# Patient Record
Sex: MALE | Race: WHITE | NOT HISPANIC OR LATINO | Employment: FULL TIME | URBAN - METROPOLITAN AREA
[De-identification: names, ages, dates, MRNs, and addresses within clinical notes are randomized per-mention and may not be internally consistent; named-entity substitution may affect disease eponyms.]

---

## 2018-01-27 ENCOUNTER — HOSPITAL ENCOUNTER (EMERGENCY)
Facility: CLINIC | Age: 38
Discharge: HOME | End: 2018-01-27
Attending: EMERGENCY MEDICINE | Admitting: EMERGENCY MEDICINE
Payer: MEDICAID

## 2018-01-27 ENCOUNTER — NURSE TRIAGE (OUTPATIENT)
Dept: NURSING | Facility: CLINIC | Age: 38
End: 2018-01-27

## 2018-01-27 VITALS
DIASTOLIC BLOOD PRESSURE: 87 MMHG | HEART RATE: 85 BPM | TEMPERATURE: 97.6 F | SYSTOLIC BLOOD PRESSURE: 137 MMHG | OXYGEN SATURATION: 98 % | RESPIRATION RATE: 16 BRPM | BODY MASS INDEX: 29.02 KG/M2 | WEIGHT: 208 LBS

## 2018-01-27 DIAGNOSIS — L03.211 FACIAL CELLULITIS: ICD-10-CM

## 2018-01-27 PROCEDURE — 99284 EMERGENCY DEPT VISIT MOD MDM: CPT | Mod: Z6 | Performed by: EMERGENCY MEDICINE

## 2018-01-27 PROCEDURE — 99282 EMERGENCY DEPT VISIT SF MDM: CPT | Performed by: EMERGENCY MEDICINE

## 2018-01-27 RX ORDER — AMOXICILLIN 500 MG/1
500 CAPSULE ORAL 3 TIMES DAILY
Qty: 30 CAPSULE | Refills: 0 | Status: SHIPPED | OUTPATIENT
Start: 2018-01-27 | End: 2018-01-28

## 2018-01-27 NOTE — ED NOTES
Patient had a filling fall out in out of huis front teeth about a month ago. Today the area above that tooth is significantly swollen.

## 2018-01-27 NOTE — ED AVS SNAPSHOT
[facility] Emergency Department    [address] [place] [address]    Phone:  [phone]    Fax:  [phone]                                       Christopher Euceda Jr.   MRN: [id number]    Department:  [facility] Emergency Department   Date of Visit:  1/27/2018           After Visit Summary Signature Page     I have received my discharge instructions, and my questions have been answered. I have discussed any challenges I see with this plan with the nurse or doctor.    ..........................................................................................................................................  Patient/Patient Representative Signature      ..........................................................................................................................................  Patient Representative Print Name and Relationship to Patient    ..................................................               ................................................  Date                                            Time    ..........................................................................................................................................  Reviewed by Signature/Title    ...................................................              ..............................................  Date                                                            Time

## 2018-01-27 NOTE — DISCHARGE INSTRUCTIONS
Facial Cellulitis  Cellulitis is an infection of the deep layers of skin. A break in the skin, such as a cut or scratch, can let bacteria under the skin. It may also occur from an infected oil gland (pimple) or hair follicle. If the bacteria get to deep layers of the skin, it can be serious. If not treated, cellulitis can get into the bloodstream and lymph nodes. The infection can then spread throughout the body. This causes serious illness.  Cellulitis causes the affected skin to become red, swollen, warm, and sore. The reddened areas have a visible border. You may have a fever, chills, and pain.  Cellulitis is treated with antibiotics taken for 7 to 10 days. Symptoms should get better 1 to 2 days after treatment is started. Make sure to take all the antibiotics for the full number of days until they are gone. Keep taking the medication even if your symptoms go away.  Home care  Follow these tips:    Take all of the antibiotic medicine exactly as directed until it is gone. Don t miss any doses, especially during the first 7 days. Don t stop taking it when your symptoms get better.    Use a cool compress (face cloth soaked in cool water) on your face to help reduce swelling and pain.    You may use acetaminophen or ibuprofen to reduce pain. Don t use these if you have chronic liver or kidney disease, or ever had a stomach ulcer or gastrointestinal bleeding. Talk with your healthcare provider first.  Follow-up care  Follow up with your healthcare provider, or as advised. If your infection does not go away on the first antibiotic, your healthcare provider will prescribe a different one.  When to seek medical advice  Call your healthcare provider right away if any of these occur:    Fever higher of 100.4  F (38.0  C) or higher after 2 days on antibiotics    Red areas that spread    Swelling or pain that gets worse    Fluid leaking from the skin (pus)    An eyelid that swells shut or leaks fluid (pus)    Headache or  "neck pain that gets worse    Unusual drowsiness or confusion    Convulsions (seizure)    Change in eyesight     Date Last Reviewed: 9/1/2016 2000-2017 [employer]. [address] [place] [address], [place] [address]. All rights reserved. This information is not intended as a substitute for professional medical care. Always follow your healthcare professional's instructions.          Dental Abscess with Facial Cellulitis    A dental abscess is an infection at the base of a tooth. It means a pocket of pus has formed at the tip of a tooth root in your jaw bone. If the infection isn t treated, it can appear as a swelling on the gum near the tooth. More serious infections spread to the face. This causes your face to swell (cellulitis). This is a very serious condition. Once the swelling begins, it can spread quickly.  A dental abscess usually starts with a crack or cavity in a tooth. The pain is often made worse by drinking hot or cold beverages, or biting on hard foods. The pain may spread from the tooth to your ear or the area of your jaw on the same side.  Home care  Follow these tips when caring for yourself at home:    Avoid hot and cold foods and drinks. Your tooth may be sensitive to changes in temperature. Don t chew on the side of the infected tooth.    If your tooth is chipped or cracked, or if there is a large open cavity, put oil of cloves directly on the tooth to relieve pain. You can buy oil of cloves at drugstores. Some pharmacies carry an over-the-counter \"toothache kit.\" This contains a paste that you can put on the exposed tooth to make it less sensitive.    Put a cold pack on your jaw over the sore area to help reduce pain.    You may use over-the-counter medication to ease pain, unless another medicine was prescribed. If you have chronic liver or kidney disease, talk with your health care provider before using acetaminophen or ibuprofen. Also talk with your provider if you ve had a stomach " ulcer or GI bleeding.    An antibiotic will be prescribed. Take it exactly as directed. Don t miss any doses.  Call 911  Call 911 if any of these occur:    Swelling spreads to the upper half of your face or neck    You eyelids begin to swell shut    Unusual drowsiness    Headache or a stiff neck    Weakness or fainting    Difficulty swallowing or breathing  Follow-up care  Follow up with your dentist or an oral surgeon as advised. Severe cases of cellulitis must be checked again within 24 hours. Once an infection occurs in a tooth, it will continue to be a problem until the infection is drained. This is done through surgery or a root canal. Or you may need to have your tooth pulled.  When to seek medical advice  Call your healthcare provider right away if any of these occur:    Pain gets worse or spreads to your neck    Fever of 100.4 F (38 C) or higher, or as directed by your healthcare provider  Date Last Reviewed: 10/1/2016    2062-9336 [employer]. [address] [place] [address], [place] [address]. All rights reserved. This information is not intended as a substitute for professional medical care. Always follow your healthcare professional's instructions.

## 2018-01-27 NOTE — ED PROVIDER NOTES
History     Chief Complaint   Patient presents with     Facial Swelling     HPI  Christopher Euceda Jr. is a 37 year old male who awoke this morning with right maxillary facial swelling.  He has had no fever.  Pain is minimal.  He has a history of poor dentition and a history of methamphetamine use.  Swelling is moderate without erythema.  He has had this once previously.    Problem List:    Patient Active Problem List    Diagnosis Date Noted     CARDIOVASCULAR SCREENING; LDL GOAL LESS THAN 160 10/15/2012     Priority: Medium     Facial cellulitis 10/03/2012     Priority: Medium        Past Medical History:    Past Medical History:   Diagnosis Date     Cellulitis, face 10/03/2012       Past Surgical History:    History reviewed. No pertinent surgical history.    Family History:    Family History   Problem Relation Age of Onset     GASTROINTESTINAL DISEASE Father      crohns     Cardiovascular Father      MI       Social History:  Marital Status:  Single [1]  Social History   Substance Use Topics     Smoking status: Current Every Day Smoker     Packs/day: 0.50     Smokeless tobacco: Current User     Alcohol use 0.0 oz/week     2 - 12 Cans of beer per week      Comment: 2x/month        Medications:      amoxicillin (AMOXIL) 500 MG capsule         Review of Systems  All other systems are reviewed and are negative    Physical Exam   BP: 137/87  Pulse: 85  Temp: 97.6  F (36.4  C)  Resp: 16  Weight: 94.3 kg (208 lb)  SpO2: 98 %      Physical Exam   Constitutional: He appears well-developed and well-nourished. No distress.   HENT:   Head: Normocephalic and atraumatic.   Mouth/Throat: Oropharynx is clear and moist.   Diffusely poor dentition.  Swelling in the right upper molar region.  Some right maxillary swelling and minimal tenderness.  No erythema.  No areas of fluctuance.  No trismus.  No orbital involvement.   Eyes: Conjunctivae are normal. Right eye exhibits no discharge. Left eye exhibits no discharge. No scleral  icterus.   Neck: Normal range of motion. Neck supple.   Cardiovascular: Normal rate, regular rhythm and normal heart sounds.    No murmur heard.  Pulmonary/Chest: Effort normal and breath sounds normal. No stridor. No respiratory distress.   Abdominal: Soft. There is no tenderness.   Musculoskeletal: Normal range of motion.   Neurological: He is alert. No cranial nerve deficit. He exhibits normal muscle tone.   Skin: Skin is warm and dry. No rash noted. He is not diaphoretic. No erythema. No pallor.   Psychiatric: He has a normal mood and affect.   Nursing note and vitals reviewed.      ED Course     ED Course     Procedures               Critical Care time:  none               Labs Ordered and Resulted from Time of ED Arrival Up to the Time of Departure from the ED - No data to display    Assessments & Plan (with Medical Decision Making)  Right maxillary facial cellulitis of dental origin.  Placed on amoxicillin.  Follow-up with dentistry as soon as possible.  Return if condition worsens per     I have reviewed the nursing notes.    I have reviewed the findings, diagnosis, plan and need for follow up with the patient.       New Prescriptions    AMOXICILLIN (AMOXIL) 500 MG CAPSULE    Take 1 capsule (500 mg) by mouth 3 times daily for 10 days       Final diagnoses:   Facial cellulitis       1/27/2018   [facility] EMERGENCY DEPARTMENT     Shaan York MD  01/27/18 1960

## 2018-01-27 NOTE — ED AVS SNAPSHOT
[facility] Emergency Department    [address] [place] [address]    Phone:  [phone]    Fax:  [phone]                                       Christopher Euceda Jr.   MRN: [id number]    Department:  [facility] Emergency Department   Date of Visit:  1/27/2018           Patient Information     Date Of Birth          1980        Your diagnoses for this visit were:     Facial cellulitis        You were seen by Shaan York MD.      Follow-up Information     Schedule an appointment as soon as possible for a visit with dentist.        Discharge Instructions         Facial Cellulitis  Cellulitis is an infection of the deep layers of skin. A break in the skin, such as a cut or scratch, can let bacteria under the skin. It may also occur from an infected oil gland (pimple) or hair follicle. If the bacteria get to deep layers of the skin, it can be serious. If not treated, cellulitis can get into the bloodstream and lymph nodes. The infection can then spread throughout the body. This causes serious illness.  Cellulitis causes the affected skin to become red, swollen, warm, and sore. The reddened areas have a visible border. You may have a fever, chills, and pain.  Cellulitis is treated with antibiotics taken for 7 to 10 days. Symptoms should get better 1 to 2 days after treatment is started. Make sure to take all the antibiotics for the full number of days until they are gone. Keep taking the medication even if your symptoms go away.  Home care  Follow these tips:    Take all of the antibiotic medicine exactly as directed until it is gone. Don t miss any doses, especially during the first 7 days. Don t stop taking it when your symptoms get better.    Use a cool compress (face cloth soaked in cool water) on your face to help reduce swelling and pain.    You may use acetaminophen or ibuprofen to reduce pain. Don t use these if you have chronic liver or kidney disease, or ever had a  stomach ulcer or gastrointestinal bleeding. Talk with your healthcare provider first.  Follow-up care  Follow up with your healthcare provider, or as advised. If your infection does not go away on the first antibiotic, your healthcare provider will prescribe a different one.  When to seek medical advice  Call your healthcare provider right away if any of these occur:    Fever higher of 100.4  F (38.0  C) or higher after 2 days on antibiotics    Red areas that spread    Swelling or pain that gets worse    Fluid leaking from the skin (pus)    An eyelid that swells shut or leaks fluid (pus)    Headache or neck pain that gets worse    Unusual drowsiness or confusion    Convulsions (seizure)    Change in eyesight     Date Last Reviewed: 9/1/2016 2000-2017 [employer]. [address] [place] [address], [place] [address]. All rights reserved. This information is not intended as a substitute for professional medical care. Always follow your healthcare professional's instructions.          Dental Abscess with Facial Cellulitis    A dental abscess is an infection at the base of a tooth. It means a pocket of pus has formed at the tip of a tooth root in your jaw bone. If the infection isn t treated, it can appear as a swelling on the gum near the tooth. More serious infections spread to the face. This causes your face to swell (cellulitis). This is a very serious condition. Once the swelling begins, it can spread quickly.  A dental abscess usually starts with a crack or cavity in a tooth. The pain is often made worse by drinking hot or cold beverages, or biting on hard foods. The pain may spread from the tooth to your ear or the area of your jaw on the same side.  Home care  Follow these tips when caring for yourself at home:    Avoid hot and cold foods and drinks. Your tooth may be sensitive to changes in temperature. Don t chew on the side of the infected tooth.    If your tooth is chipped or cracked, or if there is a  "large open cavity, put oil of cloves directly on the tooth to relieve pain. You can buy oil of cloves at drugstores. Some pharmacies carry an over-the-counter \"toothache kit.\" This contains a paste that you can put on the exposed tooth to make it less sensitive.    Put a cold pack on your jaw over the sore area to help reduce pain.    You may use over-the-counter medication to ease pain, unless another medicine was prescribed. If you have chronic liver or kidney disease, talk with your health care provider before using acetaminophen or ibuprofen. Also talk with your provider if you ve had a stomach ulcer or GI bleeding.    An antibiotic will be prescribed. Take it exactly as directed. Don t miss any doses.  Call 911  Call 911 if any of these occur:    Swelling spreads to the upper half of your face or neck    You eyelids begin to swell shut    Unusual drowsiness    Headache or a stiff neck    Weakness or fainting    Difficulty swallowing or breathing  Follow-up care  Follow up with your dentist or an oral surgeon as advised. Severe cases of cellulitis must be checked again within 24 hours. Once an infection occurs in a tooth, it will continue to be a problem until the infection is drained. This is done through surgery or a root canal. Or you may need to have your tooth pulled.  When to seek medical advice  Call your healthcare provider right away if any of these occur:    Pain gets worse or spreads to your neck    Fever of 100.4 F (38 C) or higher, or as directed by your healthcare provider  Date Last Reviewed: 10/1/2016    7157-5542 [employer]. [address] [place] [address], [place] [address]. All rights reserved. This information is not intended as a substitute for professional medical care. Always follow your healthcare professional's instructions.          24 Hour Appointment Hotline       To make an appointment at any [facility], call [phone] ([phone]). If you don't have a family " "doctor or clinic, we will help you find one. [facility] are conveniently located to serve the needs of you and your family.             Review of your medicines      START taking        Dose / Directions Last dose taken    amoxicillin 500 MG capsule   Commonly known as:  AMOXIL   Dose:  500 mg   Quantity:  30 capsule        Take 1 capsule (500 mg) by mouth 3 times daily for 10 days   Refills:  0                Prescriptions were sent or printed at these locations (1 Prescription)                   [facility], [place] - [address]    [address] , [facility] [address]    Telephone:  [phone]   Fax:  [phone]   Hours:                  E-Prescribed (1 of 1)         amoxicillin (AMOXIL) 500 MG capsule                Orders Needing Specimen Collection     None      Pending Results     No orders found from 1/25/2018 to 1/28/2018.            Pending Culture Results     No orders found from 1/25/2018 to 1/28/2018.            Pending Results Instructions     If you had any lab results that were not finalized at the time of your Discharge, you can call the ED Lab Result RN at [phone]. You will be contacted by this team for any positive Lab results or changes in treatment. The nurses are available 7 days a week from 10A to 6:30P.  You can leave a message 24 hours per day and they will return your call.        Thank you for choosing [facility]       Thank you for choosing [facility] for your care. Our goal is always to provide you with excellent care. Hearing back from our patients is one way we can continue to improve our services. Please take a few minutes to complete the written survey that you may receive in the mail after you visit with us. Thank you!        [employer] Information     [employer] lets you send messages to your doctor, view your test results, renew your prescriptions, schedule appointments and more. To sign up, go to [email or web address] . Click on \"Log in\" on the " "left side of the screen, which will take you to the Welcome page. Then click on \"Sign up Now\" on the right side of the page.     You will be asked to enter the access code listed below, as well as some personal information. Please follow the directions to create your username and password.     Your access code is: [email or web address]  Expires: 2018 11:57 AM     Your access code will  in 90 days. If you need help or a new code, please call your [facility] clinic or [phone].        Care EveryWhere ID     This is your Care EveryWhere ID. This could be used by other organizations to access your [facility] medical records  [id number]        Equal Access to Services     MAGGY MASSEY : Marjorie Romero, eris casillas, scooby nunes, rolanda ybarra. So [facility] [phone].    ATENCIÓN: Si habla español, tiene a gonzales disposición servicios gratuitos de asistencia lingüística. Llame al [phone].    We comply with applicable federal civil rights laws and [place] laws. We do not discriminate on the basis of race, color, national origin, age, disability, sex, sexual orientation, or gender identity.            After Visit Summary       This is your record. Keep this with you and show to your community pharmacist(s) and doctor(s) at your next visit.                  "

## 2018-01-28 ENCOUNTER — HOSPITAL ENCOUNTER (EMERGENCY)
Facility: CLINIC | Age: 38
Discharge: HOME | End: 2018-01-28
Attending: FAMILY MEDICINE | Admitting: FAMILY MEDICINE
Payer: MEDICAID

## 2018-01-28 ENCOUNTER — APPOINTMENT (OUTPATIENT)
Dept: CT IMAGING | Facility: CLINIC | Age: 38
End: 2018-01-28
Attending: FAMILY MEDICINE
Payer: MEDICAID

## 2018-01-28 ENCOUNTER — NURSE TRIAGE (OUTPATIENT)
Dept: NURSING | Facility: CLINIC | Age: 38
End: 2018-01-28

## 2018-01-28 VITALS
BODY MASS INDEX: 29.12 KG/M2 | HEIGHT: 71 IN | WEIGHT: 208 LBS | DIASTOLIC BLOOD PRESSURE: 94 MMHG | TEMPERATURE: 97.2 F | OXYGEN SATURATION: 97 % | SYSTOLIC BLOOD PRESSURE: 140 MMHG | RESPIRATION RATE: 20 BRPM

## 2018-01-28 DIAGNOSIS — K04.7 ABSCESSED TOOTH: ICD-10-CM

## 2018-01-28 LAB
ANION GAP SERPL CALCULATED.3IONS-SCNC: 6 MMOL/L (ref 3–14)
BASOPHILS # BLD AUTO: 0 10E9/L (ref 0–0.2)
BASOPHILS NFR BLD AUTO: 0.2 %
BUN SERPL-MCNC: 13 MG/DL (ref 7–30)
CALCIUM SERPL-MCNC: 8.3 MG/DL (ref 8.5–10.1)
CHLORIDE SERPL-SCNC: 112 MMOL/L (ref 94–109)
CO2 SERPL-SCNC: 25 MMOL/L (ref 20–32)
CREAT SERPL-MCNC: 0.77 MG/DL (ref 0.66–1.25)
DIFFERENTIAL METHOD BLD: ABNORMAL
EOSINOPHIL # BLD AUTO: 0.5 10E9/L (ref 0–0.7)
EOSINOPHIL NFR BLD AUTO: 4 %
ERYTHROCYTE [DISTWIDTH] IN BLOOD BY AUTOMATED COUNT: 13.1 % (ref 10–15)
GFR SERPL CREATININE-BSD FRML MDRD: >90 ML/MIN/1.7M2
GLUCOSE SERPL-MCNC: 105 MG/DL (ref 70–99)
HCT VFR BLD AUTO: 39.9 % (ref 40–53)
HGB BLD-MCNC: 13.3 G/DL (ref 13.3–17.7)
LYMPHOCYTES # BLD AUTO: 2.5 10E9/L (ref 0.8–5.3)
LYMPHOCYTES NFR BLD AUTO: 19.7 %
MCH RBC QN AUTO: 31.1 PG (ref 26.5–33)
MCHC RBC AUTO-ENTMCNC: 33.3 G/DL (ref 31.5–36.5)
MCV RBC AUTO: 93 FL (ref 78–100)
MONOCYTES # BLD AUTO: 1.4 10E9/L (ref 0–1.3)
MONOCYTES NFR BLD AUTO: 11 %
NEUTROPHILS # BLD AUTO: 8.4 10E9/L (ref 1.6–8.3)
NEUTROPHILS NFR BLD AUTO: 65.1 %
PLATELET # BLD AUTO: 345 10E9/L (ref 150–450)
POTASSIUM SERPL-SCNC: 4 MMOL/L (ref 3.4–5.3)
RBC # BLD AUTO: 4.27 10E12/L (ref 4.4–5.9)
SODIUM SERPL-SCNC: 143 MMOL/L (ref 133–144)
WBC # BLD AUTO: 12.9 10E9/L (ref 4–11)

## 2018-01-28 PROCEDURE — 70487 CT MAXILLOFACIAL W/DYE: CPT

## 2018-01-28 PROCEDURE — 99284 EMERGENCY DEPT VISIT MOD MDM: CPT | Mod: 25 | Performed by: FAMILY MEDICINE

## 2018-01-28 PROCEDURE — 25000128 H RX IP 250 OP 636: Performed by: FAMILY MEDICINE

## 2018-01-28 PROCEDURE — 96365 THER/PROPH/DIAG IV INF INIT: CPT | Performed by: FAMILY MEDICINE

## 2018-01-28 PROCEDURE — 85025 COMPLETE CBC W/AUTO DIFF WBC: CPT | Performed by: FAMILY MEDICINE

## 2018-01-28 PROCEDURE — 25000125 ZZHC RX 250: Performed by: FAMILY MEDICINE

## 2018-01-28 PROCEDURE — 99283 EMERGENCY DEPT VISIT LOW MDM: CPT | Mod: Z6 | Performed by: FAMILY MEDICINE

## 2018-01-28 PROCEDURE — 80048 BASIC METABOLIC PNL TOTAL CA: CPT | Performed by: FAMILY MEDICINE

## 2018-01-28 RX ORDER — BUPIVACAINE HYDROCHLORIDE 5 MG/ML
1 INJECTION, SOLUTION EPIDURAL; INTRACAUDAL ONCE
Status: DISCONTINUED | OUTPATIENT
Start: 2018-01-28 | End: 2018-01-28

## 2018-01-28 RX ORDER — IOPAMIDOL 755 MG/ML
100 INJECTION, SOLUTION INTRAVASCULAR ONCE
Status: COMPLETED | OUTPATIENT
Start: 2018-01-28 | End: 2018-01-28

## 2018-01-28 RX ORDER — AMPICILLIN AND SULBACTAM 2; 1 G/1; G/1
3 INJECTION, POWDER, FOR SOLUTION INTRAMUSCULAR; INTRAVENOUS ONCE
Status: COMPLETED | OUTPATIENT
Start: 2018-01-28 | End: 2018-01-28

## 2018-01-28 RX ADMIN — IOPAMIDOL 99 ML: 755 INJECTION, SOLUTION INTRAVENOUS at 01:22

## 2018-01-28 RX ADMIN — SODIUM CHLORIDE 70 ML: 9 INJECTION, SOLUTION INTRAVENOUS at 01:21

## 2018-01-28 RX ADMIN — AMPICILLIN SODIUM AND SULBACTAM SODIUM 3 G: 2; 1 INJECTION, POWDER, FOR SOLUTION INTRAMUSCULAR; INTRAVENOUS at 02:58

## 2018-01-28 NOTE — ED AVS SNAPSHOT
[facility] Emergency Department    [address] [place] [address]    Phone:  [phone]    Fax:  [phone]                                       Christopher Euceda Jr.   MRN: [id number]    Department:  [facility] Emergency Department   Date of Visit:  1/28/2018           Patient Information     Date Of Birth          1980        Your diagnoses for this visit were:     Abscessed tooth #6       You were seen by Santiago Hyde MD.      Follow-up Information     Schedule an appointment as soon as possible for a visit with a dentist.        Discharge Instructions       Warm packs 4 times a day.  Take the Augmentin twice a day as directed.  See a dentist as soon as possible.  You have an abscessed tooth that needs to be taken care of.  Tylenol/ibuprofen as needed for pain.  Return to the ED if worse/concerns.  It was nice visiting with you this morning.  I hope this settles down quickly for you.    Thank you for choosing [facility]. We appreciate the opportunity to meet your urgent medical needs. Please let us know if we could have done anything to make your stay more satisfying.    After discharge, please closely monitor for any new or worsening symptoms. Return to the Emergency Department if you develop any acute worsening signs or symptoms.    If you had lab work, cultures or imaging studies done during your stay, the final results may still be pending. We will call you if your plan of care needs to change. However, if you are not improving as expected, please follow up with your primary care provider or clinic.     Start any prescription medications that were prescribed to you and take them as directed.     Please see additional handouts that may be pertinent to your condition.        Dental Abscess  An abscess is a sac of pus. A dental abscess forms when a tooth or the tissue around it becomes infected with bacteria. The bacteria can enter through a  cavity or a crack in a tooth. It can also infect the gum tissue or bone around a tooth. An untreated abscess can cause the loss of the tooth. It can even spread to other parts of the body and become life-threatening.    Symptoms of a dental abscess   Signs of a dental abscess include:    Toothache, often severe    Tooth pain with hot, cold, or pressure    Pain in the gums, cheek, or jaw    Bad breath or bitter taste in the mouth    Trouble swallowing or opening the mouth    Fever    Swollen or enlarged glands in the neck  Diagnosing a dental abscess  An abscess is diagnosed by looking at your teeth and gums. You will be told if any tests are needed, such as dental X-rays.  Treating a dental abscess  Treatments for a dental abscess may include the following:    Antibiotic medicines. These treat the underlying infection.    Pain relievers. These help you feel more comfortable. You may use over-the-counter pain relievers, such as acetaminophen or ibuprofen.    Warm saltwater rinses. These can soothe discomfort and help clear away pus.    Root canal surgery.  This may be done if needed to save the tooth. With a root canal, the infected part of the tooth is removed. A special substance is then used to fill the empty space in the tooth.    Draining the abscess. This may be doneif needed. Incisions are made to allow the infected material to drain from the tooth.    Removing the tooth. This is done in cases of severe infection that can t be treated another way.  You may need to be admitted to a hospital if the infection is severe, has spread, or doesn t respond to treatment.     When to call the dentist  Call your dentist right away if you have any of the following:    Fever of 100.4 F (38 C) or higher    Increased pain, redness, drainage, or swelling in the treated area    Swelling of the face or jawbone    Pain that can't be controlled with medicines   Preventing dental abscess  To prevent another abscess in the future,  keep your teeth clean and healthy. Brush twice a day and floss at least once daily. See your dentist for regular tooth cleanings. And stay away from sugary foods and drinks that can lead to tooth decay.  Date Last Reviewed: 6/1/2017 2000-2017 [employer]. [address] [place] [address]. All rights reserved. This information is not intended as a substitute for professional medical care. Always follow your healthcare professional's instructions.            24 Hour Appointment Hotline       To make an appointment at any [facility], call [phone] ([phone]). If you don't have a family doctor or clinic, we will help you find one. [facility] clinics are conveniently located to serve the needs of you and your family.             Review of your medicines      START taking        Dose / Directions Last dose taken    amoxicillin-clavulanate 875-125 MG per tablet   Commonly known as:  AUGMENTIN   Dose:  1 tablet   Quantity:  20 tablet        Take 1 tablet by mouth 2 times daily for 10 days   Refills:  0          STOP taking        Dose Reason for stopping Comments    amoxicillin 500 MG capsule   Commonly known as:  AMOXIL                      Prescriptions were sent or printed at these locations (1 Prescription)                   [facility]   [address] [address]    Telephone:  [phone]   Fax:  [phone]   Hours:                  These medications are not ready yet because we are checking if your insurance will help you pay for them. Call your pharmacy to confirm that your medication is ready for pickup. It may take up to 24 hours for them to receive the prescription. If the prescription is not ready within 3 business days, please contact your clinic or your provider (1 of 1)         amoxicillin-clavulanate (AUGMENTIN) 875-125 MG per tablet                Procedures and tests performed during your visit     Basic metabolic panel  "   CBC with platelets differential    CT Maxillofacial w Contrast    Peripheral IV catheter      Orders Needing Specimen Collection     None      Pending Results     Date and Time Order Name Status Description    2018 0050 CT Maxillofacial w Contrast In process             Pending Culture Results     No orders found from 2018 to 2018.            Pending Results Instructions     If you had any lab results that were not finalized at the time of your Discharge, you can call the ED Lab Result RN at [phone]. You will be contacted by this team for any positive Lab results or changes in treatment. The nurses are available 7 days a week from 10A to 6:30P.  You can leave a message 24 hours per day and they will return your call.        Thank you for choosing [facility]       Thank you for choosing [facility] for your care. Our goal is always to provide you with excellent care. Hearing back from our patients is one way we can continue to improve our services. Please take a few minutes to complete the written survey that you may receive in the mail after you visit with us. Thank you!        Arav Information     Arav lets you send messages to your doctor, view your test results, renew your prescriptions, schedule appointments and more. To sign up, go to [email or web address] . Click on \"Log in\" on the left side of the screen, which will take you to the Welcome page. Then click on \"Sign up Now\" on the right side of the page.     You will be asked to enter the access code listed below, as well as some personal information. Please follow the directions to create your username and password.     Your access code is: [email or web address]  Expires: 2018 11:57 AM     Your access code will  in 90 days. If you need help or a new code, please call your [facility] clinic or [phone].        Care EveryWhere ID     This is your Care EveryWhere ID. This could be used by other organizations to access your [facility] " medical records  [id number]        Equal Access to Services     MAGGY MASSEY : Marjorie Romero, eris casillas, rolanda mosley. So [facility] [phone].    ATENCIÓN: Si habla español, tiene a gonzales disposición servicios gratuitos de asistencia lingüística. Llame al [phone].    We comply with applicable federal civil rights laws and [place] laws. We do not discriminate on the basis of race, color, national origin, age, disability, sex, sexual orientation, or gender identity.            After Visit Summary       This is your record. Keep this with you and show to your community pharmacist(s) and doctor(s) at your next visit.

## 2018-01-28 NOTE — TELEPHONE ENCOUNTER
Reason for Disposition    [1] SEVERE eyelid swelling (i.e., shut or almost) AND [2] fever     Swelling has increased about two inches since he was discharged from the ER. Advised to let them know he has an history of cellulitis in the same area and he was on IV antibiotics at that time, for 16 days. He said he forgot to mention that to the ER MD. He has taken one round of oral antibiotics since being evaluated in the ER. Eye is almost swollen shut.    Additional Information    Negative: [1] Life-threatening reaction (anaphylaxis) in the past to similar substance (e.g., food, insect bite/sting, chemical, etc.) AND [2] < 2 hours since exposure    Negative: Unresponsive, passed out or very weak    Negative: Swollen tongue    Negative: Difficulty breathing or wheezing    Negative: Sounds like a life-threatening emergency to the triager    Swelling mainly around the eyes    Negative: Unresponsive, passed out or very weak    Negative: Difficulty breathing or wheezing    Negative: [1] Difficulty swallowing or slurred speech AND [2] sudden onset    Negative: Sounds like a life-threatening emergency to the triager    Negative: Recent injury to the eye    Negative: Entire face is swollen    Negative: Sacs of clear fluid (blisters) on whites of eyes (allergic cysts)    Negative: Contact with pollen, other allergic substance or eyedrops    Negative: [1] Bee sting AND [2] within last 24 hours    Negative: Insect bite suspected    Negative: Sty suspected (small, painful red lump present on lid margin    Negative: Yellow or green discharge (pus) in the eye    Negative: Redness of white area (sclera) of eye(s)    Protocols used: EYE - SWELLING-ADULT-, FACE SWELLING-ADULT-    They will go back to the ER.  Swelling has increased about two inches since he was discharged from the ER. Advised to let them know he has an history of cellulitis in the same area and he was on IV antibiotics at that time, for 16 days. He said he forgot  to mention that to the ER MD. He has taken one round of oral antibiotics since being evaluated in the ER. Eye is almost swollen shut.  Ora Gong RN-[facility]

## 2018-01-28 NOTE — ED AVS SNAPSHOT
[facility] Emergency Department    [address] [place] [address]    Phone:  [phone]    Fax:  [phone]                                       Christopher Euceda Jr.   MRN: [id number]    Department:  [facility] Emergency Department   Date of Visit:  1/28/2018           After Visit Summary Signature Page     I have received my discharge instructions, and my questions have been answered. I have discussed any challenges I see with this plan with the nurse or doctor.    ..........................................................................................................................................  Patient/Patient Representative Signature      ..........................................................................................................................................  Patient Representative Print Name and Relationship to Patient    ..................................................               ................................................  Date                                            Time    ..........................................................................................................................................  Reviewed by Signature/Title    ...................................................              ..............................................  Date                                                            Time

## 2018-01-28 NOTE — ED PROVIDER NOTES
History     Chief Complaint   Patient presents with     Facial Swelling     HPI  Christopher Euceda Jr. is a 37 year old male who resents to the ED this morning with increasing right facial swelling.  He was seen yesterday by Dr. York and started on amoxicillin for right facial cellulitis of dental origin.      He broke a tooth on Francoise day while playing with the kids.  Hit it on the corner of a counter.  Plans on seeing a dentist but has been delayed as he was incarcerated for a short period of time because of child support.  He states he is an addict and will be going to CD treatment next week and is excited to go.        Has taken 3 doses of the amoxicillin.  With the increasing swelling he called the nurse line and was told to come in for reevaluation.        He had a right-sided facial cellulitis back in October 2012 that required a couple of weeks of IV antibiotics.  Does not feel quite the same.  He denies any fevers.  No swallowing difficulty.  No breathing problems    Problem List:    Patient Active Problem List    Diagnosis Date Noted     CARDIOVASCULAR SCREENING; LDL GOAL LESS THAN 160 10/15/2012     Priority: Medium     Facial cellulitis 10/03/2012     Priority: Medium        Past Medical History:    Past Medical History:   Diagnosis Date     Cellulitis, face 10/03/2012       Past Surgical History:    History reviewed. No pertinent surgical history.    Family History:    Family History   Problem Relation Age of Onset     GASTROINTESTINAL DISEASE Father      crohns     Cardiovascular Father      MI       Social History:  Marital Status:  Single [1]  Social History   Substance Use Topics     Smoking status: Current Every Day Smoker     Packs/day: 0.50     Smokeless tobacco: Current User     Alcohol use 0.0 oz/week     2 - 12 Cans of beer per week      Comment: 2x/month        Medications:      amoxicillin-clavulanate (AUGMENTIN) 875-125 MG per tablet         Review of Systems   All other systems  "reviewed and are negative.      Physical Exam   BP: (!) 140/94  Heart Rate: 86  Temp: 97.2  F (36.2  C)  Resp: 20  Height: 180.3 cm (5' 11\")  Weight: 94.3 kg (208 lb)  SpO2: 97 %      Physical Exam   Constitutional: He is oriented to person, place, and time. He appears well-developed and well-nourished. No distress.   HENT:   Moderate right sided facial swelling over the maxillary region.  Mild swelling up under the eye.  Poor dentition.  Extensive decay.  Left upper tooth with a piece broken off per   Pulmonary/Chest: Effort normal. No respiratory distress.   Neurological: He is alert and oriented to person, place, and time.   Skin: Skin is warm and dry. There is erythema (mild right face).   Psychiatric: He has a normal mood and affect.       ED Course    IV placed.  Labs drawn.  Facial CT with contrast ordered looking for abscess.    Facial CT shows a 1.5 cm abscess at the base of #6.    After local infiltration with Marcaine 0.5% plain, I tried to aspirate this from an intraoral approach.  Using ultrasound guidance I had the needle in the abscess space but was unable to aspirate any significant fluid.  He tolerated this quite well.      White count up slightly at 12.9 per    He was given 3 g of Unasyn IV and I'm going to switch him from amoxicillin to Augmentin.  I encouraged him to see his dentist as soon as possible.  He will likely just need to have that tooth removed.    I wished him well in rehab.         ED Course     Procedures        Results for orders placed or performed during the hospital encounter of 01/28/18 (from the past 24 hour(s))   CBC with platelets differential   Result Value Ref Range    WBC 12.9 (H) 4.0 - 11.0 10e9/L    RBC Count 4.27 (L) 4.4 - 5.9 10e12/L    Hemoglobin 13.3 13.3 - 17.7 g/dL    Hematocrit 39.9 (L) 40.0 - 53.0 %    MCV 93 78 - 100 fl    MCH 31.1 26.5 - 33.0 pg    MCHC 33.3 31.5 - 36.5 g/dL    RDW 13.1 10.0 - 15.0 %    Platelet Count 345 150 - 450 10e9/L    Diff Method " Automated Method     % Neutrophils 65.1 %    % Lymphocytes 19.7 %    % Monocytes 11.0 %    % Eosinophils 4.0 %    % Basophils 0.2 %    Absolute Neutrophil 8.4 (H) 1.6 - 8.3 10e9/L    Absolute Lymphocytes 2.5 0.8 - 5.3 10e9/L    Absolute Monocytes 1.4 (H) 0.0 - 1.3 10e9/L    Absolute Eosinophils 0.5 0.0 - 0.7 10e9/L    Absolute Basophils 0.0 0.0 - 0.2 10e9/L   Basic metabolic panel   Result Value Ref Range    Sodium 143 133 - 144 mmol/L    Potassium 4.0 3.4 - 5.3 mmol/L    Chloride 112 (H) 94 - 109 mmol/L    Carbon Dioxide 25 20 - 32 mmol/L    Anion Gap 6 3 - 14 mmol/L    Glucose 105 (H) 70 - 99 mg/dL    Urea Nitrogen 13 7 - 30 mg/dL    Creatinine 0.77 0.66 - 1.25 mg/dL    GFR Estimate >90 >60 mL/min/1.7m2    GFR Estimate If Black >90 >60 mL/min/1.7m2    Calcium 8.3 (L) 8.5 - 10.1 mg/dL     CT of the facial bones:  Right facial STS. Associated 1.5 cm rim enhancing fluid pocket adjacent to right anterior maxilla. Small Underying tooth demonstrates surrounding rim or luceny and there is a tiny adjacent bony defect.    Findings c/w abscess of dental origin.  The tooth just posterior to this also has some lucency about the root.  Mild mucosal thickening in multiple sinuses.    Final report to follow.    Assessments & Plan (with Medical Decision Making)    (K04.7) Abscessed tooth  Comment: #6  Plan: amoxicillin-clavulanate (AUGMENTIN) 875-125 MG         per tablet        Verbal and written discharge instructions given.        I have reviewed the nursing notes.    I have reviewed the findings, diagnosis, plan and need for follow up with the patient.       Discharge Medication List as of 1/28/2018  3:33 AM      START taking these medications    Details   amoxicillin-clavulanate (AUGMENTIN) 875-125 MG per tablet Take 1 tablet by mouth 2 times daily for 10 days, Disp-20 tablet, R-0, E-Prescribe             Final diagnoses:   Abscessed tooth - #6       1/28/2018   [facility] EMERGENCY DEPARTMENT     Santiago Hyde  MD Tom  01/28/18 0621       Santiago Hyde MD  01/28/18 0623

## 2018-01-28 NOTE — TELEPHONE ENCOUNTER
Brother-in-law is caller. Christopher was seen at [facility] ED last night with an abscess tooth. He has more swelling in his face today and his eye is almost swollen shut. Reviewed discharge notes. Advised patient to return to the ED for evaluation.   Jen Raygoza RN/FNA

## 2018-01-28 NOTE — ED NOTES
Pt in with facial swelling on the right cheek area of face. Was seen earlier today and given antibiotics but the swelling has increased up into eye area

## 2018-01-28 NOTE — DISCHARGE INSTRUCTIONS
Warm packs 4 times a day.  Take the Augmentin twice a day as directed.  See a dentist as soon as possible.  You have an abscessed tooth that needs to be taken care of.  Tylenol/ibuprofen as needed for pain.  Return to the ED if worse/concerns.  It was nice visiting with you this morning.  I hope this settles down quickly for you.    Thank you for choosing [facility]. We appreciate the opportunity to meet your urgent medical needs. Please let us know if we could have done anything to make your stay more satisfying.    After discharge, please closely monitor for any new or worsening symptoms. Return to the Emergency Department if you develop any acute worsening signs or symptoms.    If you had lab work, cultures or imaging studies done during your stay, the final results may still be pending. We will call you if your plan of care needs to change. However, if you are not improving as expected, please follow up with your primary care provider or clinic.     Start any prescription medications that were prescribed to you and take them as directed.     Please see additional handouts that may be pertinent to your condition.        Dental Abscess  An abscess is a sac of pus. A dental abscess forms when a tooth or the tissue around it becomes infected with bacteria. The bacteria can enter through a cavity or a crack in a tooth. It can also infect the gum tissue or bone around a tooth. An untreated abscess can cause the loss of the tooth. It can even spread to other parts of the body and become life-threatening.    Symptoms of a dental abscess   Signs of a dental abscess include:    Toothache, often severe    Tooth pain with hot, cold, or pressure    Pain in the gums, cheek, or jaw    Bad breath or bitter taste in the mouth    Trouble swallowing or opening the mouth    Fever    Swollen or enlarged glands in the neck  Diagnosing a dental abscess  An abscess is diagnosed by looking at your teeth and gums. You  will be told if any tests are needed, such as dental X-rays.  Treating a dental abscess  Treatments for a dental abscess may include the following:    Antibiotic medicines. These treat the underlying infection.    Pain relievers. These help you feel more comfortable. You may use over-the-counter pain relievers, such as acetaminophen or ibuprofen.    Warm saltwater rinses. These can soothe discomfort and help clear away pus.    Root canal surgery.  This may be done if needed to save the tooth. With a root canal, the infected part of the tooth is removed. A special substance is then used to fill the empty space in the tooth.    Draining the abscess. This may be doneif needed. Incisions are made to allow the infected material to drain from the tooth.    Removing the tooth. This is done in cases of severe infection that can t be treated another way.  You may need to be admitted to a hospital if the infection is severe, has spread, or doesn t respond to treatment.     When to call the dentist  Call your dentist right away if you have any of the following:    Fever of 100.4 F (38 C) or higher    Increased pain, redness, drainage, or swelling in the treated area    Swelling of the face or jawbone    Pain that can't be controlled with medicines   Preventing dental abscess  To prevent another abscess in the future, keep your teeth clean and healthy. Brush twice a day and floss at least once daily. See your dentist for regular tooth cleanings. And stay away from sugary foods and drinks that can lead to tooth decay.  Date Last Reviewed: 6/1/2017 2000-2017 [employer]. [address] [place] [address], [place] [address]. All rights reserved. This information is not intended as a substitute for professional medical care. Always follow your healthcare professional's instructions.

## 2018-02-07 ENCOUNTER — TELEPHONE (OUTPATIENT)
Dept: FAMILY MEDICINE | Facility: CLINIC | Age: 38
End: 2018-02-07

## 2018-02-08 ENCOUNTER — OFFICE VISIT (OUTPATIENT)
Dept: FAMILY MEDICINE | Facility: CLINIC | Age: 38
End: 2018-02-08
Payer: MEDICAID

## 2018-02-08 VITALS
HEIGHT: 71 IN | SYSTOLIC BLOOD PRESSURE: 116 MMHG | HEART RATE: 99 BPM | OXYGEN SATURATION: 97 % | TEMPERATURE: 97.3 F | RESPIRATION RATE: 18 BRPM | DIASTOLIC BLOOD PRESSURE: 70 MMHG | BODY MASS INDEX: 31.36 KG/M2 | WEIGHT: 224 LBS

## 2018-02-08 DIAGNOSIS — Z00.00 ROUTINE GENERAL MEDICAL EXAMINATION AT A HEALTH CARE FACILITY: Primary | ICD-10-CM

## 2018-02-08 DIAGNOSIS — Z11.3 SCREEN FOR STD (SEXUALLY TRANSMITTED DISEASE): ICD-10-CM

## 2018-02-08 PROCEDURE — 86803 HEPATITIS C AB TEST: CPT | Performed by: FAMILY MEDICINE

## 2018-02-08 PROCEDURE — 87389 HIV-1 AG W/HIV-1&-2 AB AG IA: CPT | Performed by: FAMILY MEDICINE

## 2018-02-08 PROCEDURE — 99385 PREV VISIT NEW AGE 18-39: CPT | Performed by: FAMILY MEDICINE

## 2018-02-08 PROCEDURE — 87591 N.GONORRHOEAE DNA AMP PROB: CPT | Performed by: FAMILY MEDICINE

## 2018-02-08 PROCEDURE — 87491 CHLMYD TRACH DNA AMP PROBE: CPT | Performed by: FAMILY MEDICINE

## 2018-02-08 PROCEDURE — 36415 COLL VENOUS BLD VENIPUNCTURE: CPT | Performed by: FAMILY MEDICINE

## 2018-02-08 ASSESSMENT — PAIN SCALES - GENERAL: PAINLEVEL: NO PAIN (0)

## 2018-02-08 NOTE — NURSING NOTE
"Chief Complaint   Patient presents with     Physical       Initial /70  Pulse 99  Temp 97.3  F (36.3  C) (Tympanic)  Resp 18  Ht 5' 10.5\" (1.791 m)  Wt 224 lb (101.6 kg)  SpO2 97%  BMI 31.69 kg/m2 Estimated body mass index is 31.69 kg/(m^2) as calculated from the following:    Height as of this encounter: 5' 10.5\" (1.791 m).    Weight as of this encounter: 224 lb (101.6 kg).  BP completed using cuff size: karin Francois MA      "

## 2018-02-08 NOTE — MR AVS SNAPSHOT
After Visit Summary   2/8/2018    Christopher Euceda Jr.    MRN: [id number]           Patient Information     Date Of Birth          1980        Visit Information        Provider Department      2/8/2018 9:20 AM Dung Pelaez MD [facility]        Today's Diagnoses     Routine general medical examination at a health care facility    -  1    Screen for STD (sexually transmitted disease)          Care Instructions      Preventive Health Recommendations  Male Ages 26 - 39    Yearly exam:             See your health care provider every year in order to  o   Review health changes.   o   Discuss preventive care.    o   Review your medicines if your doctor has prescribed any.    You should be tested each year for STDs (sexually transmitted diseases), if you re at risk.     After age 35, talk to your provider about cholesterol testing. If you are at risk for heart disease, have your cholesterol tested at least every 5 years.     If you are at risk for diabetes, you should have a diabetes test (fasting glucose).  Shots: Get a flu shot each year. Get a tetanus shot every 10 years.     Nutrition:    Eat at least 5 servings of fruits and vegetables daily.     Eat whole-grain bread, whole-wheat pasta and brown rice instead of white grains and rice.     Talk to your provider about Calcium and Vitamin D.     Lifestyle    Exercise for at least 150 minutes a week (30 minutes a day, 5 days a week). This will help you control your weight and prevent disease.     Limit alcohol to one drink per day.     No smoking.     Wear sunscreen to prevent skin cancer.     See your dentist every six months for an exam and cleaning.             Follow-ups after your visit        Who to contact     If you have questions or need follow up information about today's clinic visit or your schedule please contact [facility] directly at [phone].  Normal or non-critical lab and imaging results will be  "communicated to you by Tappxhart, letter or phone within 4 business days after the clinic has received the results. If you do not hear from us within 7 days, please contact the clinic through Mattermark or phone. If you have a critical or abnormal lab result, we will notify you by phone as soon as possible.  Submit refill requests through Mattermark or call your pharmacy and they will forward the refill request to us. Please allow 3 business days for your refill to be completed.          Additional Information About Your Visit        Mattermark Information     Mattermark lets you send messages to your doctor, view your test results, renew your prescriptions, schedule appointments and more. To sign up, go to [email or web address] . Click on \"Log in\" on the left side of the screen, which will take you to the Welcome page. Then click on \"Sign up Now\" on the right side of the page.     You will be asked to enter the access code listed below, as well as some personal information. Please follow the directions to create your username and password.     Your access code is: [email or web address]  Expires: 2018 11:57 AM     Your access code will  in 90 days. If you need help or a new code, please call your [facility] clinic or [phone].        Care EveryWhere ID     This is your Care EveryWhere ID. This could be used by other organizations to access your [facility] medical records  [id number]        Your Vitals Were     Pulse Temperature Respirations Height Pulse Oximetry BMI (Body Mass Index)    99 97.3  F (36.3  C) (Tympanic) 18 5' 10.5\" (1.791 m) 97% 31.69 kg/m2       Blood Pressure from Last 3 Encounters:   18 116/70   18 (!) 140/94   18 137/87    Weight from Last 3 Encounters:   18 224 lb (101.6 kg)   18 208 lb (94.3 kg)   18 208 lb (94.3 kg)              We Performed the Following     CHLAMYDIA TRACHOMATIS PCR     Hepatitis C antibody     HIV Antigen Antibody Combo     NEISSERIA GONORRHOEA " CLEO        Primary Care Provider Fax #    Physician No Ref-Primary [phone]       No address on file        Equal Access to Services     MAGGY MASSEY : Hadii aad ku hadbarbaratej Romero, davionshana hammondsayanha, scooby diegodenver willivannesa, rolanda trishin hayaamatt márqueztonia snow laKristinaharshad ybarra. So [facility] [phone].    ATENCIÓN: Si habla español, tiene a gonzales disposición servicios gratuitos de asistencia lingüística. Llame al [phone].    We comply with applicable federal civil rights laws and [place] laws. We do not discriminate on the basis of race, color, national origin, age, disability, sex, sexual orientation, or gender identity.            Thank you!     Thank you for choosing [facility]  for your care. Our goal is always to provide you with excellent care. Hearing back from our patients is one way we can continue to improve our services. Please take a few minutes to complete the written survey that you may receive in the mail after your visit with us. Thank you!             Your Updated Medication List - Protect others around you: Learn how to safely use, store and throw away your medicines at www.disposemymeds.org.      Notice  As of 2/8/2018  9:49 AM    You have not been prescribed any medications.

## 2018-02-08 NOTE — PROGRESS NOTES
SUBJECTIVE:   CC: Christopher Euceda Jr. is an 37 year old male who presents for preventative health visit.     Physical   Annual:     Getting at least 3 servings of Calcium per day::  Yes    Bi-annual eye exam::  NO    Dental care twice a year::  NO    Sleep apnea or symptoms of sleep apnea::  None    Diet::  Regular (no restrictions)    Taking medications regularly::  Yes    Medication side effects::  None    Additional concerns today::  No                    Today's PHQ-2 Score:   PHQ-2 ( 1999 Pfizer) 2/8/2018   Q1: Little interest or pleasure in doing things 0   Q2: Feeling down, depressed or hopeless 0   PHQ-2 Score 0   Q1: Little interest or pleasure in doing things Not at all   Q2: Feeling down, depressed or hopeless Not at all   PHQ-2 Score 0       Abuse: Current or Past(Physical, Sexual or Emotional)- No  Do you feel safe in your environment - Yes    Social History   Substance Use Topics     Smoking status: Current Every Day Smoker     Packs/day: 0.50     Smokeless tobacco: Current User     Alcohol use 0.0 oz/week     2 - 12 Cans of beer per week      Comment: 2x/month     Alcohol Use 2/8/2018   If you drink alcohol, do you typically have greater than 3 drinks per day OR greater than 7 drinks per week?   No   No flowsheet data found.    Last PSA: No results found for: PSA    Reviewed orders with patient. Reviewed health maintenance and updated orders accordingly - Yes      Reviewed and updated as needed this visit by clinical staff  Allergies  Meds         Reviewed and updated as needed this visit by Provider        Past Medical History:   Diagnosis Date     Cellulitis, face 10/03/2012      No past surgical history on file.    Review of Systems  C: NEGATIVE for fever, chills, change in weight  I: NEGATIVE for worrisome rashes, moles or lesions  E: NEGATIVE for vision changes or irritation  ENT: NEGATIVE for ear, mouth and throat problems  R: NEGATIVE for significant cough or SOB  CV: NEGATIVE for chest pain,  palpitations or peripheral edema  GI: NEGATIVE for nausea, abdominal pain, heartburn, or change in bowel habits   male: negative for dysuria, hematuria, decreased urinary stream, erectile dysfunction, urethral discharge  M: NEGATIVE for significant arthralgias or myalgia  NEURO: Some numbness and tingling of his right arm at times.  P: NEGATIVE for changes in mood or affect    OBJECTIVE:   There were no vitals taken for this visit.    Physical Exam  GENERAL: healthy, alert and no distress  EYES: Eyes grossly normal to inspection, PERRL and conjunctivae and sclerae normal  HENT: ear canals and TM's normal, nose and mouth without ulcers or lesions  NECK: no adenopathy, no asymmetry, masses, or scars and thyroid normal to palpation  RESP: lungs clear to auscultation - no rales, rhonchi or wheezes  CV: regular rate and rhythm, normal S1 S2, no S3 or S4, no murmur, click or rub, no peripheral edema and peripheral pulses strong  ABDOMEN: soft, nontender, no hepatosplenomegaly, no masses and bowel sounds normal  MS: no gross musculoskeletal defects noted, no edema  SKIN: no suspicious lesions or rashes  NEURO: Normal strength and tone, mentation intact and speech normal  PSYCH: mentation appears normal, affect normal/bright    ASSESSMENT/PLAN:   1. Routine general medical examination at a health care facility  He is going into an inpatient chemical dependency treatment program.  Needs his physical for that in forms to be filled out.    2. Screen for STD (sexually transmitted disease)  He would like the results mailed to him.  screaning no symptoms.  - NEISSERIA GONORRHOEA PCR  - CHLAMYDIA TRACHOMATIS PCR  - HIV Antigen Antibody Combo  - Hepatitis C antibody    COUNSELING:   Reviewed preventive health counseling, as reflected in patient instructions       Regular exercise       Healthy diet/nutrition       Vision screening         reports that he has been smoking.  He has been smoking about 0.50 packs per day. He uses  "smokeless tobacco.  Tobacco Cessation Action Plan: Self help information given to patient  Estimated body mass index is 29.01 kg/(m^2) as calculated from the following:    Height as of 1/28/18: 5' 11\" (1.803 m).    Weight as of 1/28/18: 208 lb (94.3 kg).       Counseling Resources:  ATP IV Guidelines  Pooled Cohorts Equation Calculator  FRAX Risk Assessment  ICSI Preventive Guidelines  Dietary Guidelines for Americans, 2010  USDA's MyPlate  ASA Prophylaxis  Lung CA Screening    Dung Pelaez MD  [facility]  "

## 2018-02-09 LAB
C TRACH DNA SPEC QL NAA+PROBE: NEGATIVE
HCV AB SERPL QL IA: NONREACTIVE
HIV 1+2 AB+HIV1 P24 AG SERPL QL IA: NONREACTIVE
N GONORRHOEA DNA SPEC QL NAA+PROBE: NEGATIVE
SPECIMEN SOURCE: NORMAL
SPECIMEN SOURCE: NORMAL

## 2018-02-14 ENCOUNTER — HOSPITAL ENCOUNTER (EMERGENCY)
Facility: CLINIC | Age: 38
Discharge: HOME | End: 2018-02-14
Attending: FAMILY MEDICINE | Admitting: FAMILY MEDICINE
Payer: MEDICAID

## 2018-02-14 VITALS
OXYGEN SATURATION: 96 % | DIASTOLIC BLOOD PRESSURE: 80 MMHG | WEIGHT: 229.19 LBS | BODY MASS INDEX: 32.42 KG/M2 | RESPIRATION RATE: 14 BRPM | TEMPERATURE: 97.7 F | HEART RATE: 74 BPM | SYSTOLIC BLOOD PRESSURE: 123 MMHG

## 2018-02-14 DIAGNOSIS — R07.89 RIGHT-SIDED CHEST WALL PAIN: ICD-10-CM

## 2018-02-14 LAB
ANION GAP SERPL CALCULATED.3IONS-SCNC: 6 MMOL/L (ref 3–14)
BASOPHILS # BLD AUTO: 0 10E9/L (ref 0–0.2)
BASOPHILS NFR BLD AUTO: 0.2 %
BUN SERPL-MCNC: 11 MG/DL (ref 7–30)
CALCIUM SERPL-MCNC: 8.3 MG/DL (ref 8.5–10.1)
CHLORIDE SERPL-SCNC: 106 MMOL/L (ref 94–109)
CO2 SERPL-SCNC: 25 MMOL/L (ref 20–32)
CREAT SERPL-MCNC: 0.67 MG/DL (ref 0.66–1.25)
D DIMER PPP FEU-MCNC: 0.5 UG/ML FEU (ref 0–0.5)
DIFFERENTIAL METHOD BLD: NORMAL
EOSINOPHIL # BLD AUTO: 0.5 10E9/L (ref 0–0.7)
EOSINOPHIL NFR BLD AUTO: 5.3 %
ERYTHROCYTE [DISTWIDTH] IN BLOOD BY AUTOMATED COUNT: 13.8 % (ref 10–15)
FLUAV+FLUBV AG SPEC QL: NEGATIVE
FLUAV+FLUBV AG SPEC QL: NEGATIVE
GFR SERPL CREATININE-BSD FRML MDRD: >90 ML/MIN/1.7M2
GLUCOSE SERPL-MCNC: 94 MG/DL (ref 70–99)
HCT VFR BLD AUTO: 43.6 % (ref 40–53)
HGB BLD-MCNC: 14.1 G/DL (ref 13.3–17.7)
IMM GRANULOCYTES # BLD: 0 10E9/L (ref 0–0.4)
IMM GRANULOCYTES NFR BLD: 0.3 %
LYMPHOCYTES # BLD AUTO: 1.8 10E9/L (ref 0.8–5.3)
LYMPHOCYTES NFR BLD AUTO: 17.5 %
MCH RBC QN AUTO: 30.1 PG (ref 26.5–33)
MCHC RBC AUTO-ENTMCNC: 32.3 G/DL (ref 31.5–36.5)
MCV RBC AUTO: 93 FL (ref 78–100)
MONOCYTES # BLD AUTO: 0.9 10E9/L (ref 0–1.3)
MONOCYTES NFR BLD AUTO: 9 %
NEUTROPHILS # BLD AUTO: 6.9 10E9/L (ref 1.6–8.3)
NEUTROPHILS NFR BLD AUTO: 67.7 %
PLATELET # BLD AUTO: 375 10E9/L (ref 150–450)
POTASSIUM SERPL-SCNC: 4.4 MMOL/L (ref 3.4–5.3)
RBC # BLD AUTO: 4.68 10E12/L (ref 4.4–5.9)
SODIUM SERPL-SCNC: 137 MMOL/L (ref 133–144)
SPECIMEN SOURCE: NORMAL
TROPONIN I SERPL-MCNC: <0.015 UG/L (ref 0–0.04)
WBC # BLD AUTO: 10.2 10E9/L (ref 4–11)

## 2018-02-14 PROCEDURE — 99284 EMERGENCY DEPT VISIT MOD MDM: CPT | Mod: 25 | Performed by: FAMILY MEDICINE

## 2018-02-14 PROCEDURE — 85025 COMPLETE CBC W/AUTO DIFF WBC: CPT | Performed by: FAMILY MEDICINE

## 2018-02-14 PROCEDURE — 96374 THER/PROPH/DIAG INJ IV PUSH: CPT | Performed by: FAMILY MEDICINE

## 2018-02-14 PROCEDURE — 87804 INFLUENZA ASSAY W/OPTIC: CPT | Mod: 91 | Performed by: FAMILY MEDICINE

## 2018-02-14 PROCEDURE — 80048 BASIC METABOLIC PNL TOTAL CA: CPT | Performed by: FAMILY MEDICINE

## 2018-02-14 PROCEDURE — 85379 FIBRIN DEGRADATION QUANT: CPT | Performed by: FAMILY MEDICINE

## 2018-02-14 PROCEDURE — 84484 ASSAY OF TROPONIN QUANT: CPT | Performed by: FAMILY MEDICINE

## 2018-02-14 PROCEDURE — 99284 EMERGENCY DEPT VISIT MOD MDM: CPT | Mod: Z6 | Performed by: FAMILY MEDICINE

## 2018-02-14 PROCEDURE — 25000128 H RX IP 250 OP 636: Performed by: FAMILY MEDICINE

## 2018-02-14 RX ORDER — KETOROLAC TROMETHAMINE 30 MG/ML
30 INJECTION, SOLUTION INTRAMUSCULAR; INTRAVENOUS ONCE
Status: COMPLETED | OUTPATIENT
Start: 2018-02-14 | End: 2018-02-14

## 2018-02-14 RX ADMIN — KETOROLAC TROMETHAMINE 30 MG: 30 INJECTION, SOLUTION INTRAMUSCULAR at 11:14

## 2018-02-14 NOTE — ED AVS SNAPSHOT
[facility] Emergency Department    [address] [place] [address]    Phone:  [phone]    Fax:  [phone]                                       Christopher Euceda Jr.   MRN: [id number]    Department:  [facility] Emergency Department   Date of Visit:  2/14/2018           After Visit Summary Signature Page     I have received my discharge instructions, and my questions have been answered. I have discussed any challenges I see with this plan with the nurse or doctor.    ..........................................................................................................................................  Patient/Patient Representative Signature      ..........................................................................................................................................  Patient Representative Print Name and Relationship to Patient    ..................................................               ................................................  Date                                            Time    ..........................................................................................................................................  Reviewed by Signature/Title    ...................................................              ..............................................  Date                                                            Time

## 2018-02-14 NOTE — ED NOTES
Pt here with upper right chest pain that comes and goes since last night.  Numbness in right arm for two months.

## 2018-02-14 NOTE — DISCHARGE INSTRUCTIONS
Chest Wall Pain: Costochondritis    The chest pain that you have had today is caused by costochondritis. This condition is caused by an inflammation of the cartilage joining your ribs to your breastbone. It is not caused by heart or lung problems. Your healthcare team has made sure that the chest pain you feel is not from a life threatening cause of chest pain such as heart attack, collapsed lung, blood clot in the lung, tear in the aorta, or esophageal rupture. The inflammation may have been brought on by a blow to the chest, lifting heavy objects, intense exercise, or an illness that made you cough and sneeze a lot. It often occurs during times of emotional stress. It can be painful, but it is not dangerous. It usually goes away in 1 to 2 weeks. But it may happen again. Rarely, a more serious condition may cause symptoms similar to costochondritis. That s why it s important to watch for the warning signs listed below.  Home care  Follow these guidelines when caring for yourself at home:    If you feel that emotional stress is a cause of your condition, try to figure out the sources of that stress. It may not be obvious. Learn ways to deal with the stress in your life. This can include regular exercise, muscle relaxation, meditation, or simply taking time out for yourself.    You may use acetaminophen, ibuprofen, or naproxen to control pain, unless another pain medicine was prescribed. If you have liver or kidney disease or ever had a stomach ulcer, talk with your healthcare provider before using these medicines.    You can also help ease pain by using a hot, wet compress or heating pad. Use this with or without a medicated skin cream that helps relieves pain.    Do stretching exercise as advised by your provider.    Take any prescribed medicines as directed.  Follow-up care  Follow up with your healthcare provider, or as advised, if you do not start to get better in the next 2 days.  When to seek medical  advice  Call your healthcare provider right away if any of these occur:    A change in the type of pain. Call if it feels different, becomes more serious, lasts longer, or spreads into your shoulder, arm, neck, jaw, or back.    Shortness of breath or pain gets worse when you breathe    Weakness, dizziness, or fainting    Cough with dark-colored sputum (phlegm) or blood    Abdominal pain    Dark red or black stools    Fever of 100.4 F (38 C) or higher, or as directed by your healthcare provider  Date Last Reviewed: 12/1/2016 2000-2017 [employer] [address] [place] [address]. All rights reserved. This information is not intended as a substitute for professional medical care. Always follow your healthcare professional's instructions.

## 2018-02-14 NOTE — ED AVS SNAPSHOT
[facility] Emergency Department    [address] [place] [address]    Phone:  [phone]    Fax:  [phone]                                       Christopher Euceda Jr.   MRN: [id number]    Department:  [facility] Emergency Department   Date of Visit:  2/14/2018           Patient Information     Date Of Birth          1980        Your diagnoses for this visit were:     Right-sided chest wall pain        You were seen by Casa Weinberg MD.      Follow-up Information     Follow up with [facility]. Schedule an appointment as soon as possible for a visit in 1 week.    Why:  If not improving.    Contact information:    [address] [place] [address] [phone]          Discharge Instructions         Chest Wall Pain: Costochondritis    The chest pain that you have had today is caused by costochondritis. This condition is caused by an inflammation of the cartilage joining your ribs to your breastbone. It is not caused by heart or lung problems. Your healthcare team has made sure that the chest pain you feel is not from a life threatening cause of chest pain such as heart attack, collapsed lung, blood clot in the lung, tear in the aorta, or esophageal rupture. The inflammation may have been brought on by a blow to the chest, lifting heavy objects, intense exercise, or an illness that made you cough and sneeze a lot. It often occurs during times of emotional stress. It can be painful, but it is not dangerous. It usually goes away in 1 to 2 weeks. But it may happen again. Rarely, a more serious condition may cause symptoms similar to costochondritis. That s why it s important to watch for the warning signs listed below.  Home care  Follow these guidelines when caring for yourself at home:    If you feel that emotional stress is a cause of your condition, try to figure out the sources of that stress. It may not be obvious. Learn ways to deal with the  stress in your life. This can include regular exercise, muscle relaxation, meditation, or simply taking time out for yourself.    You may use acetaminophen, ibuprofen, or naproxen to control pain, unless another pain medicine was prescribed. If you have liver or kidney disease or ever had a stomach ulcer, talk with your healthcare provider before using these medicines.    You can also help ease pain by using a hot, wet compress or heating pad. Use this with or without a medicated skin cream that helps relieves pain.    Do stretching exercise as advised by your provider.    Take any prescribed medicines as directed.  Follow-up care  Follow up with your healthcare provider, or as advised, if you do not start to get better in the next 2 days.  When to seek medical advice  Call your healthcare provider right away if any of these occur:    A change in the type of pain. Call if it feels different, becomes more serious, lasts longer, or spreads into your shoulder, arm, neck, jaw, or back.    Shortness of breath or pain gets worse when you breathe    Weakness, dizziness, or fainting    Cough with dark-colored sputum (phlegm) or blood    Abdominal pain    Dark red or black stools    Fever of 100.4 F (38 C) or higher, or as directed by your healthcare provider  Date Last Reviewed: 12/1/2016 2000-2017 [employer]. [address] [place] [address]. All rights reserved. This information is not intended as a substitute for professional medical care. Always follow your healthcare professional's instructions.          24 Hour Appointment Hotline       To make an appointment at any [facility], call [phone] ([phone]). If you don't have a family doctor or clinic, we will help you find one. [place] clinics are conveniently located to serve the needs of you and your family.             Review of your medicines      Our records show that you are taking the medicines listed below. If these are  "incorrect, please call your family doctor or clinic.        Dose / Directions Last dose taken    AMOXICILLIN PO        Refills:  0        IBUPROFEN PO   Dose:  600 mg        Take 600 mg by mouth   Refills:  0                Procedures and tests performed during your visit     Basic metabolic panel    CBC with platelets differential    D dimer quantitative    EKG 12-lead, tracing only    Influenza A/B antigen    Troponin I      Orders Needing Specimen Collection     None      Pending Results     No orders found from 2/12/2018 to 2/15/2018.            Pending Culture Results     No orders found from 2/12/2018 to 2/15/2018.            Pending Results Instructions     If you had any lab results that were not finalized at the time of your Discharge, you can call the ED Lab Result RN at [phone]. You will be contacted by this team for any positive Lab results or changes in treatment. The nurses are available 7 days a week from 10A to 6:30P.  You can leave a message 24 hours per day and they will return your call.        Thank you for choosing [facility]       Thank you for choosing [facility] for your care. Our goal is always to provide you with excellent care. Hearing back from our patients is one way we can continue to improve our services. Please take a few minutes to complete the written survey that you may receive in the mail after you visit with us. Thank you!        VectorMAXhart Information     Zeolife lets you send messages to your doctor, view your test results, renew your prescriptions, schedule appointments and more. To sign up, go to [email or web address] . Click on \"Log in\" on the left side of the screen, which will take you to the Welcome page. Then click on \"Sign up Now\" on the right side of the page.     You will be asked to enter the access code listed below, as well as some personal information. Please follow the directions to create your username and password.     Your access code is: " [email or web address]  Expires: 2018 11:57 AM     Your access code will  in 90 days. If you need help or a new code, please call your [facility] clinic or [phone].        Care EveryWhere ID     This is your Care EveryWhere ID. This could be used by other organizations to access your [facility] medical records  [id number]        Equal Access to Services     [facility] BRYSON : Hadii yg balderas Somarry, waaxda luqadaha, qaybta kaalmada adeduyda, rolanda kamara . So [facility] [phone].    ATENCIÓN: Si habla español, tiene a gonzales disposición servicios gratuitos de asistencia lingüística. Llame al [phone].    We comply with applicable federal civil rights laws and [place] laws. We do not discriminate on the basis of race, color, national origin, age, disability, sex, sexual orientation, or gender identity.            After Visit Summary       This is your record. Keep this with you and show to your community pharmacist(s) and doctor(s) at your next visit.

## 2018-02-14 NOTE — ED PROVIDER NOTES
History     Chief Complaint   Patient presents with     Chest Pain     HPI  Christopher Euceda Jr. is a 37 year old male who presents with right upper chest pain that has been intermittent since last night.  Pain seems to be worse with a deep breath, nothing seems to make it better.  Patient has not had pain like this before.  He has felt a little bit more short of breath since yesterday.  Patient denies any fevers or chills.  He has had a little bit of a cough since yesterday but it is been non-productive.  Patient does have a history of smoking cigarettes.  Patient denies any back pain, denies feeling lightheaded or dizzy.  Patient denies any nausea or vomiting.  Patient has had also had some intermittent numbness of his right arm that comes and goes but this is been going on for a few months.  He never saw anyone about this.    Problem List:    Patient Active Problem List    Diagnosis Date Noted     CARDIOVASCULAR SCREENING; LDL GOAL LESS THAN 160 10/15/2012     Priority: Medium     Facial cellulitis 10/03/2012     Priority: Medium        Past Medical History:    Past Medical History:   Diagnosis Date     Cellulitis, face 10/03/2012       Past Surgical History:    History reviewed. No pertinent surgical history.    Family History:    Family History   Problem Relation Age of Onset     GASTROINTESTINAL DISEASE Father      crohns     Cardiovascular Father      MI       Social History:  Marital Status:  Single [1]  Social History   Substance Use Topics     Smoking status: Current Every Day Smoker     Packs/day: 0.50     Smokeless tobacco: Current User     Alcohol use 0.0 oz/week     2 - 12 Cans of beer per week      Comment: 2x/month        Medications:      AMOXICILLIN PO   IBUPROFEN PO         Review of Systems   All other systems reviewed and are negative.      Physical Exam   BP: 123/80  Pulse: 74  Temp: 97.7  F (36.5  C)  Resp: 16  Weight: 104 kg (229 lb 3 oz)  SpO2: 99 %      Physical Exam   Constitutional: He  is oriented to person, place, and time. He appears well-developed and well-nourished. No distress.   HENT:   Head: Normocephalic and atraumatic.   Mouth/Throat: Oropharynx is clear and moist. No oropharyngeal exudate.   Eyes: Conjunctivae are normal.   Neck: Normal range of motion.   Cardiovascular: Normal rate, regular rhythm and normal heart sounds.    No murmur heard.  Pulmonary/Chest: Effort normal and breath sounds normal. No respiratory distress. He has no wheezes. He has no rales.   Abdominal: Soft. Bowel sounds are normal. He exhibits no distension. There is no tenderness. There is no rebound.   Musculoskeletal: Normal range of motion.   Neurological: He is alert and oriented to person, place, and time.   Skin: Skin is warm and dry. No rash noted. He is not diaphoretic.   Nursing note and vitals reviewed.      ED Course     ED Course     Procedures           Results for orders placed or performed during the hospital encounter of 02/14/18   CBC with platelets differential   Result Value Ref Range    WBC 10.2 4.0 - 11.0 10e9/L    RBC Count 4.68 4.4 - 5.9 10e12/L    Hemoglobin 14.1 13.3 - 17.7 g/dL    Hematocrit 43.6 40.0 - 53.0 %    MCV 93 78 - 100 fl    MCH 30.1 26.5 - 33.0 pg    MCHC 32.3 31.5 - 36.5 g/dL    RDW 13.8 10.0 - 15.0 %    Platelet Count 375 150 - 450 10e9/L    Diff Method Automated Method     % Neutrophils 67.7 %    % Lymphocytes 17.5 %    % Monocytes 9.0 %    % Eosinophils 5.3 %    % Basophils 0.2 %    % Immature Granulocytes 0.3 %    Absolute Neutrophil 6.9 1.6 - 8.3 10e9/L    Absolute Lymphocytes 1.8 0.8 - 5.3 10e9/L    Absolute Monocytes 0.9 0.0 - 1.3 10e9/L    Absolute Eosinophils 0.5 0.0 - 0.7 10e9/L    Absolute Basophils 0.0 0.0 - 0.2 10e9/L    Abs Immature Granulocytes 0.0 0 - 0.4 10e9/L   Basic metabolic panel   Result Value Ref Range    Sodium 137 133 - 144 mmol/L    Potassium 4.4 3.4 - 5.3 mmol/L    Chloride 106 94 - 109 mmol/L    Carbon Dioxide 25 20 - 32 mmol/L    Anion Gap 6 3 - 14  mmol/L    Glucose 94 70 - 99 mg/dL    Urea Nitrogen 11 7 - 30 mg/dL    Creatinine 0.67 0.66 - 1.25 mg/dL    GFR Estimate >90 >60 mL/min/1.7m2    GFR Estimate If Black >90 >60 mL/min/1.7m2    Calcium 8.3 (L) 8.5 - 10.1 mg/dL   Troponin I   Result Value Ref Range    Troponin I ES <0.015 0.000 - 0.045 ug/L   D dimer quantitative   Result Value Ref Range    D Dimer 0.5 0.0 - 0.50 ug/ml FEU   Influenza A/B antigen   Result Value Ref Range    Influenza A/B Agn Specimen Nares     Influenza A Negative NEG^Negative    Influenza B Negative NEG^Negative     Medications   ketorolac (TORADOL) injection 30 mg (30 mg Intravenous Given 2/14/18 1114)     Labs are reviewed and were unremarkable including a negative d-dimer and troponin.  Patient feels better after the Toradol was given.  At this point I think this chest wall pain is more muscular in nature.  Recommend symptomatic care including ice over the tender area, Tylenol and/or ibuprofen for pain.  Patient will follow-up in 5-7 days if things are not improving.    Assessments & Plan (with Medical Decision Making)  Right chest wall pain     I have reviewed the nursing notes.    I have reviewed the findings, diagnosis, plan and need for follow up with the patient.        2/14/2018   [facility] EMERGENCY DEPARTMENT     Casa Weinberg MD  02/14/18 9625

## 2018-03-14 ENCOUNTER — APPOINTMENT (OUTPATIENT)
Dept: ULTRASOUND IMAGING | Facility: CLINIC | Age: 38
End: 2018-03-14
Attending: PHYSICIAN ASSISTANT
Payer: COMMERCIAL

## 2018-03-14 ENCOUNTER — HOSPITAL ENCOUNTER (EMERGENCY)
Facility: CLINIC | Age: 38
Discharge: HOME | End: 2018-03-14
Attending: PHYSICIAN ASSISTANT | Admitting: PHYSICIAN ASSISTANT
Payer: COMMERCIAL

## 2018-03-14 VITALS — SYSTOLIC BLOOD PRESSURE: 116 MMHG | TEMPERATURE: 96.3 F | DIASTOLIC BLOOD PRESSURE: 85 MMHG | OXYGEN SATURATION: 99 %

## 2018-03-14 DIAGNOSIS — M25.452 HIP SWELLING, LEFT: ICD-10-CM

## 2018-03-14 DIAGNOSIS — R22.2 LOCALIZED SWELLING, MASS AND LUMP, TRUNK: ICD-10-CM

## 2018-03-14 DIAGNOSIS — R22.42 LUMP OF LEFT THIGH: ICD-10-CM

## 2018-03-14 PROCEDURE — 99283 EMERGENCY DEPT VISIT LOW MDM: CPT | Mod: Z6 | Performed by: PHYSICIAN ASSISTANT

## 2018-03-14 PROCEDURE — 76882 US LMTD JT/FCL EVL NVASC XTR: CPT | Mod: LT

## 2018-03-14 PROCEDURE — 99283 EMERGENCY DEPT VISIT LOW MDM: CPT | Performed by: PHYSICIAN ASSISTANT

## 2018-03-14 ASSESSMENT — ENCOUNTER SYMPTOMS
SHORTNESS OF BREATH: 0
COLOR CHANGE: 0
FEVER: 0
WOUND: 0
NUMBNESS: 0
VOMITING: 0
NAUSEA: 0
WEAKNESS: 0

## 2018-03-14 NOTE — ED AVS SNAPSHOT
[facility] Emergency Department    [address] [place] [address]    Phone:  [phone]    Fax:  [phone]                                       Christopher Euceda Jr.   MRN: [id number]    Department:  [facility] Emergency Department   Date of Visit:  3/14/2018           After Visit Summary Signature Page     I have received my discharge instructions, and my questions have been answered. I have discussed any challenges I see with this plan with the nurse or doctor.    ..........................................................................................................................................  Patient/Patient Representative Signature      ..........................................................................................................................................  Patient Representative Print Name and Relationship to Patient    ..................................................               ................................................  Date                                            Time    ..........................................................................................................................................  Reviewed by Signature/Title    ...................................................              ..............................................  Date                                                            Time

## 2018-03-14 NOTE — DISCHARGE INSTRUCTIONS
At this point, the cause of your hip swelling is not clear but it does not appear to be life-threatening.  I encourage you to apply ice or heat to the affected area.  Try gentle massage in case this is related to a muscular issue.  If there is no improvement in the next week follow-up in the clinic with Dr. Contreras, our sports medicine provider.  If you develop worsening symptoms please return to the emergency department.    Thank you for choosing [facility]'s Emergency Department. It was a pleasure taking care of you today. If you have any questions, please call [phone].    Cinthia Gillis PA-C

## 2018-03-14 NOTE — ED AVS SNAPSHOT
[facility] Emergency Department    [address]     [place] [address]    Phone:  [phone]    Fax:  [phone]                                       Christopher Euceda Jr.   MRN: [id number]    Department:  [facility] Emergency Department   Date of Visit:  3/14/2018           Patient Information     Date Of Birth          1980        Your diagnoses for this visit were:     Hip swelling, left     Lump of left thigh        You were seen by Cinthia Gillis PA-C.      Follow-up Information     Follow up with [facility] Emergency Department.    Specialty:  EMERGENCY MEDICINE    Why:  If symptoms worsen    Contact information:    [address]   [place] [address] [phone]    Additional information:    From [address]: Exit at [address] on south side of [place]. Turn right on [address]. Turn left at stoplight on [address]. [facility] will be in view two blocks ahead        Follow up with Thi Contreras MD In 1 week.    Specialty:  Family Medicine - Sports Medicine    Why:  As needed for persistent symptoms    Contact information:    [address]  [address]  [place] [facility] [address]  [phone]          Discharge Instructions       At this point, the cause of your hip swelling is not clear but it does not appear to be life-threatening.  I encourage you to apply ice or heat to the affected area.  Try gentle massage in case this is related to a muscular issue.  If there is no improvement in the next week follow-up in the clinic with Dr. Contreras, our sports medicine provider.  If you develop worsening symptoms please return to the emergency department.    Thank you for choosing [facility]'s Emergency Department. It was a pleasure taking care of you today. If you have any questions, please call [phone].    Cinthia Gillis PA-C      24 Hour Appointment Hotline       To make an appointment at  "any [facility] clinic, call [phone] ([phone]). If you don't have a family doctor or clinic, we will help you find one. [facility] clinics are conveniently located to serve the needs of you and your family.             Review of your medicines      Our records show that you are taking the medicines listed below. If these are incorrect, please call your family doctor or clinic.        Dose / Directions Last dose taken    IBUPROFEN PO   Dose:  600 mg        Take 600 mg by mouth   Refills:  0                Procedures and tests performed during your visit     US Extremity Non Vascular Left      Orders Needing Specimen Collection     None      Pending Results     No orders found from 3/12/2018 to 3/15/2018.            Pending Culture Results     No orders found from 3/12/2018 to 3/15/2018.            Pending Results Instructions     If you had any lab results that were not finalized at the time of your Discharge, you can call the ED Lab Result RN at [phone]. You will be contacted by this team for any positive Lab results or changes in treatment. The nurses are available 7 days a week from 10A to 6:30P.  You can leave a message 24 hours per day and they will return your call.        Thank you for choosing [facility]       Thank you for choosing [facility] for your care. Our goal is always to provide you with excellent care. Hearing back from our patients is one way we can continue to improve our services. Please take a few minutes to complete the written survey that you may receive in the mail after you visit with us. Thank you!        [employer] Information     [facility] lets you send messages to your doctor, view your test results, renew your prescriptions, schedule appointments and more. To sign up, go to [email or web address] . Click on \"Log in\" on the left side of the screen, which will take you to the Welcome page. Then click on \"Sign up Now\" on the right side of the page.     You will be asked to enter the " access code listed below, as well as some personal information. Please follow the directions to create your username and password.     Your access code is: [email or web address]  Expires: 2018 12:57 PM     Your access code will  in 90 days. If you need help or a new code, please call your [place] clinic or [phone].        Care EveryWhere ID     This is your Care EveryWhere ID. This could be used by other organizations to access your [place] medical records  [id number]        Equal Access to Services     [facility] : Marjorie Romero, eris casillas, scooby cortezalandrzej nunes, rolanda kamara . So [facility] [phone].    ATENCIÓN: Si habla español, tiene a gonzales disposición servicios gratuitos de asistencia lingüística. Llame al [phone].    We comply with applicable federal civil rights laws and [place] laws. We do not discriminate on the basis of race, color, national origin, age, disability, sex, sexual orientation, or gender identity.            After Visit Summary       This is your record. Keep this with you and show to your community pharmacist(s) and doctor(s) at your next visit.

## 2018-03-14 NOTE — ED PROVIDER NOTES
"  History     Chief Complaint   Patient presents with     Hip Pain     The history is provided by the patient.     Christopher Euceda Jr. is a 37 year old male who presents to the ED for evaluation of left hip pain. Patient reports that he was sledding a few days ago with his kids when he fell off the sled onto his left side. He did not have initial pain during the fall, but he felt a \"bubble\" on his left hip. He doesn't currently have pain, but the area feels irritated. He does note pain to the area of the bump with certain movements, like when walking up and down the stairs. The pain at that time shoots down is left anterior thigh. He denies numbness or tingling down the legs. He denies any lower extremity weakness. He hasn't taken anything for the pain.       Problem List:    Patient Active Problem List    Diagnosis Date Noted     CARDIOVASCULAR SCREENING; LDL GOAL LESS THAN 160 10/15/2012     Priority: Medium     Facial cellulitis 10/03/2012     Priority: Medium        Past Medical History:    Past Medical History:   Diagnosis Date     Cellulitis, face 10/03/2012       Past Surgical History:    History reviewed. No pertinent surgical history.    Family History:    Family History   Problem Relation Age of Onset     GASTROINTESTINAL DISEASE Father      crohns     Cardiovascular Father      MI       Social History:  Marital Status:  Single [1]  Social History   Substance Use Topics     Smoking status: Current Every Day Smoker     Packs/day: 0.50     Smokeless tobacco: Current User     Alcohol use 0.0 oz/week     2 - 12 Cans of beer per week      Comment: 2x/month        Medications:      IBUPROFEN PO         Review of Systems   Constitutional: Negative for fever.   Respiratory: Negative for shortness of breath.    Cardiovascular: Negative for chest pain.   Gastrointestinal: Negative for nausea and vomiting.   Musculoskeletal: Negative for gait problem.        Left hip pain and swelling   Skin: Negative for color " change and wound.   Neurological: Negative for weakness and numbness (or tingling).   All other systems reviewed and are negative.      Physical Exam   BP: 116/85  Heart Rate: 76  Temp: 96.3  F (35.7  C)  SpO2: 99 %    Physical Exam   Constitutional: He is oriented to person, place, and time. He appears well-developed and well-nourished. No distress.   HENT:   Head: Normocephalic and atraumatic.   Eyes: Conjunctivae are normal. No scleral icterus.   Neck: Normal range of motion. Neck supple.   Cardiovascular: Intact distal pulses.    Pulmonary/Chest: Effort normal. No respiratory distress. He has no decreased breath sounds. He has no rhonchi.   Musculoskeletal:        Left hip: He exhibits normal range of motion and normal strength.   Patient exhibits pain with hip flexion against resistance. There is no pain with extension of left hip or with internal or external rotation against resistance.   Neurological: He is alert and oriented to person, place, and time. He has normal strength. No sensory deficit.   Skin: Skin is warm and dry. He is not diaphoretic.   Overlying the left anterior hip near the hip flexor musculature 7 cm x 5 cm area of soft tissue swelling with minimal tenderness and swelling, no overlying erythema or ecchymosis   Psychiatric: He has a normal mood and affect. His behavior is normal.   Nursing note and vitals reviewed.    ED Course     ED Course     Procedures    Results for orders placed or performed during the hospital encounter of 03/14/18 (from the past 24 hour(s))   US Extremity Non Vascular Left    Narrative    ULTRASOUND LEFT EXTREMITY NON VASCULAR March 14, 2018 2:45 PM     HISTORY: Left anterior hip swelling.       Impression    IMPRESSION: 7.4 x 1.9 x 5.2 cm hyperechoic region is noted in the  region of the palpable left hip lump. Small amount of internal  vascularity is noted suggesting that this does not represent a  hematoma. No cyst or fluid collection is identified.    JENNIFER  MD TRINA       Medications - No data to display    Assessments & Plan (with Medical Decision Making)  Christopher Euceda Jr. Is a 37 year old male who presented to the ED with left anterior hip swelling that developed over the last 5 days after falling off a slide.  He denies any significant pain to the hip, but does note pain to the area with certain movements.  No fever, no other symptoms.  On exam today he exhibited full strength in both lower extremities.  He did have pain with flexion of the hip against resistance, but otherwise normal range of motion and no pain with movement otherwise.  He did have a 7 x 5 cm area of soft tissue swelling with minimal tenderness, there was no overlying skin changes.  There was concern for possible muscular injury versus hematoma causing swelling. No evidence for infection. An ultrasound today showed a 7.4×1.9×5.2 cm area of hyperechoic tissue but there was some internal vascularity noted that suggested this is not likely a hematoma.  I did discuss results with the radiologist.  He could not definitively tell what this finding was, but suggested MRI study for further imaging if indicated.  These results were discussed with the patient.  He was offered MRI study versus close monitoring at home and follow-up in the outpatient setting as needed, since symptoms are not particularly bothersome to him. He preferred conservative measures at this time and elected to hold on further imaging today.  He was advised use of heat or ice to the affected area, gentle massage to the area in case this is a large muscular spasm versus injury related to his fall.  He was provided the contact information for Dr. Contreras, our sports medicine provider, for follow-up as needed if symptoms persist.  He should return to the ED for worsening symptoms.  All questions answered and patient comfortable with this plan and discharged to home.     I have reviewed the nursing notes.    I have reviewed the  findings, diagnosis, plan and need for follow up with the patient.    New Prescriptions    No medications on file       Final diagnoses:   Hip swelling, left   Lump of left thigh     This document serves as a record of services personally performed by Cinthia Gillis PA-C. It was created on their behalf by Tasha Randolph, a trained medical scribe. The creation of this record is based on the provider's personal observations and the statements of the patient. This document has been checked and approved by the attending provider.    Note: Chart documentation done in part with Dragon Voice Recognition software. Although reviewed after completion, some word and grammatical errors may remain.      3/14/2018   [facility] EMERGENCY DEPARTMENT     Cinthia Gillis PA-C  03/14/18 1525

## 2018-03-14 NOTE — ED NOTES
"Here with pain to left hip. States he was sledding a few days ago with his kids and he fell off the sled. He did not feel injured at the time but states he has this \"Bubble\" on his hip that is painful  "

## 2018-09-11 ENCOUNTER — APPOINTMENT (OUTPATIENT)
Dept: GENERAL RADIOLOGY | Facility: CLINIC | Age: 38
End: 2018-09-11
Attending: FAMILY MEDICINE
Payer: COMMERCIAL

## 2018-09-11 ENCOUNTER — HOSPITAL ENCOUNTER (EMERGENCY)
Facility: CLINIC | Age: 38
Discharge: HOME | End: 2018-09-11
Attending: FAMILY MEDICINE | Admitting: FAMILY MEDICINE
Payer: COMMERCIAL

## 2018-09-11 VITALS
TEMPERATURE: 97.7 F | WEIGHT: 235 LBS | RESPIRATION RATE: 18 BRPM | OXYGEN SATURATION: 97 % | DIASTOLIC BLOOD PRESSURE: 64 MMHG | BODY MASS INDEX: 33.24 KG/M2 | SYSTOLIC BLOOD PRESSURE: 112 MMHG

## 2018-09-11 DIAGNOSIS — R07.89 RIGHT-SIDED CHEST WALL PAIN: ICD-10-CM

## 2018-09-11 PROCEDURE — 71101 X-RAY EXAM UNILAT RIBS/CHEST: CPT | Mod: TC,RT

## 2018-09-11 PROCEDURE — 99283 EMERGENCY DEPT VISIT LOW MDM: CPT | Performed by: FAMILY MEDICINE

## 2018-09-11 PROCEDURE — 99284 EMERGENCY DEPT VISIT MOD MDM: CPT | Mod: Z6 | Performed by: FAMILY MEDICINE

## 2018-09-11 RX ORDER — LIDOCAINE 50 MG/G
1 PATCH TOPICAL EVERY 24 HOURS
Qty: 6 PATCH | Refills: 1 | Status: SHIPPED | OUTPATIENT
Start: 2018-09-11 | End: 2018-11-28

## 2018-09-11 RX ORDER — MELOXICAM 15 MG/1
15 TABLET ORAL DAILY
Qty: 10 TABLET | Refills: 0 | Status: SHIPPED | OUTPATIENT
Start: 2018-09-11 | End: 2018-11-27

## 2018-09-11 ASSESSMENT — ENCOUNTER SYMPTOMS
DYSURIA: 0
FATIGUE: 0
CHEST TIGHTNESS: 1
NEUROLOGICAL NEGATIVE: 1
ABDOMINAL PAIN: 0
SHORTNESS OF BREATH: 0
HEMATURIA: 0
GASTROINTESTINAL NEGATIVE: 1
PALPITATIONS: 0
APPETITE CHANGE: 0
FEVER: 0
CHILLS: 0
PSYCHIATRIC NEGATIVE: 1
EYES NEGATIVE: 1

## 2018-09-11 NOTE — ED PROVIDER NOTES
History     Chief Complaint   Patient presents with     Back Pain     HPI  Christopher Euceda Jr. is a 37 year old male who presents to the ER with concerns about right sided lower back pain x 1 month but worse through the night.  He states that he might have strained his mid back area as he works as a  a couple weeks ago and has had some pain on and off for about 2-3 weeks to the area but last night it awoke him a couple times through the night so he thought he should get it checked.  He denies any shortness of breath but does admit to some increased pain with cough and with deep breathing efforts to the right posterior rib area.  He denies any rash or fever.  He denies any shortness of breath symptoms.  He denies any changes to his urine or stool habits.  He states that he is a recovering drug user and wants to avoid any narcotic medications.  He does admit to being a cigarette smoker.        I reviewed the following nurse triage note:  Sidra White RN 9/11/2018  6:35 AM        Pt presents with right sided back pain.  Pt states that it woke him at 0100 and 0300 this morning.  Pt states that it has been irritating him for about one month.   No medications prior to arrival.  Pt states that pain is worse with movement.  For example he tried to get out of bed and rolled to the floor then had pain getting up.              Problem List:    Patient Active Problem List    Diagnosis Date Noted     CARDIOVASCULAR SCREENING; LDL GOAL LESS THAN 160 10/15/2012     Priority: Medium     Facial cellulitis 10/03/2012     Priority: Medium        Past Medical History:    Past Medical History:   Diagnosis Date     Cellulitis, face 10/03/2012       Past Surgical History:    History reviewed. No pertinent surgical history.    Family History:    Family History   Problem Relation Age of Onset     GASTROINTESTINAL DISEASE Father      crohns     Cardiovascular Father      MI       Social History:  Marital Status:  Single  [1]  Social History   Substance Use Topics     Smoking status: Current Every Day Smoker     Packs/day: 0.75     Smokeless tobacco: Former User     Alcohol use No      Comment: sober since 01/2018        Medications:      IBUPROFEN PO         Review of Systems   Constitutional: Negative for appetite change, chills, fatigue and fever.   HENT: Negative.    Eyes: Negative.    Respiratory: Positive for chest tightness (right posterior lower rib cage area pain). Negative for shortness of breath.    Cardiovascular: Negative for chest pain, palpitations and leg swelling.   Gastrointestinal: Negative.  Negative for abdominal pain.   Genitourinary: Negative.  Negative for dysuria and hematuria.   Skin: Negative for rash.   Neurological: Negative.    Psychiatric/Behavioral: Negative.    All other systems reviewed and are negative.      Physical Exam   BP: 112/64  Heart Rate: 69  Temp: 97.7  F (36.5  C)  Resp: 18  Weight: 106.6 kg (235 lb)  SpO2: 97 %      Physical Exam   Constitutional: He is oriented to person, place, and time. He appears well-developed and well-nourished. No distress.   HENT:   Head: Normocephalic and atraumatic.   Eyes: Conjunctivae and EOM are normal.   Neck: Normal range of motion. Neck supple.   Cardiovascular: Normal rate and normal heart sounds.    Pulmonary/Chest: Effort normal and breath sounds normal. No stridor. No respiratory distress. He has no wheezes. He has no rales.         He exhibits tenderness.   Abdominal: Soft. There is no tenderness. There is no CVA tenderness.   Musculoskeletal:        Thoracic back: He exhibits spasm (right ).        Lumbar back: Normal.        Back:    Neurological: He is alert and oriented to person, place, and time. He has normal strength.   Skin: No lesion and no rash noted. Rash is not vesicular. No erythema.   Psychiatric: He has a normal mood and affect. His speech is normal and behavior is normal. Thought content normal. Cognition and memory are normal.    Nursing note and vitals reviewed.      ED Course     ED Course     Procedures               Critical Care time:  none               Results for orders placed or performed during the hospital encounter of 09/11/18 (from the past 24 hour(s))   Ribs XR, unilat 3 views + PA chest, right    Narrative    RIBS AND CHEST RIGHT THREE VIEWS September 11, 2018 7:32 AM     HISTORY: Right posterior rib pain.     COMPARISON: Chest x-rays dated 10/3/2012.    FINDINGS: The lungs are clear. No pleural effusions or pneumothorax.  Heart size and vascularity are normal. No fracture is identified.  Specifically, no right rib fracture is seen at the site marked as the  area of pain. There has been interval removal of the right PICC line.      Impression    IMPRESSION: No evidence of acute cardiopulmonary disease or rib  fracture is seen.                Assessments & Plan (with Medical Decision Making)  37-year-old male to the emergency room secondary concerns of right posterior rib cage area pain is been present for a couple weeks but worse through the night.  Exam findings consistent with reproducible tenderness to the intercostal musculature of the right posterior lower rib cage with palpation.  Patient without tenderness to the kidney area bilaterally.  No signs of obvious deformity or acute injury identified.  No vesicular type lesions noted to the skin of the right posterior thoracic area.  Chest x-ray was performed given his increased pain with deep breathing as well as his smoking history without evidence for abnormality noted on the rib and x-ray of the chest.  Discussed the findings with the patient.  Encouraged to use of ice therapy with gentle stretching.  Encourage smoking sensation and deep breathing exercises.  Also he was given a prescription for Lidoderm patch as well as meloxicam to use as needed for the pain.  With instructions appropriate use of these medicines.  Follow-up with his clinic if he is not improved in the  next 10-14 days.     I have reviewed the nursing notes.    I have reviewed the findings, diagnosis, plan and need for follow up with the patient.       New Prescriptions    LIDOCAINE (LIDODERM) 5 % PATCH    Place 1 patch onto the skin every 24 hours    MELOXICAM (MOBIC) 15 MG TABLET    Take 1 tablet (15 mg) by mouth daily for 10 days TAKE WITH FOOD AS NEEDED FOR PAIN. WEAN OFF OF THE MEDICATIONS AS YOUR SYMPTOMS IMPROVE.          I verbally discussed the findings of the evaluation today in the ER. I have verbally discussed with Christopher the suggested treatment(s) as described in the discharge instructions and handouts. I have prescribed the above listed medications and instructed him on appropriate use of these medications.      I have verbally suggested he follow-up in his clinic or return to the ER for increased symptoms. See the follow-up recommendations documented  in the after visit summary in this visit's EPIC chart.      Final diagnoses:   Right-sided chest wall pain - posterior lower rib area pain       9/11/2018   [facility] EMERGENCY DEPARTMENT     Vince Aquino DO  09/11/18 0804

## 2018-09-11 NOTE — DISCHARGE INSTRUCTIONS
Please read and follow the handout(s) instructions. Return, if needed, for increased or worsening symptoms and as directed by the handout(s).    It is important for you to ice the area of pain/spasm if not using the Lidoderm pain patch. Use the ice for 10-15 minutes every 1-2 hours as needed for the pain. Gently stretch the area after the ice while the area is still cold. Swim or walk daily. This will help prevent the muscle from weakening which will eventually cause more risk of spasm/pain. Take your medications as directed only (see the med list).  Return, if needed, for increased symptoms as directed your handout(s).     Vince Aquino DO

## 2018-09-11 NOTE — ED TRIAGE NOTES
Pt presents with right sided back pain.  Pt states that it woke him at 0100 and 0300 this morning.  Pt states that it has been irritating him for about one month.   No medications prior to arrival.  Pt states that pain is worse with movement.  For example he tried to get out of bed and rolled to the floor then had pain getting up.

## 2018-09-11 NOTE — ED AVS SNAPSHOT
[facility] Emergency Department    [address] [place] [address]    Phone:  [phone]    Fax:  [phone]                                       Christopher Euceda Jr.   MRN: [id number]    Department:  [facility] Emergency Department   Date of Visit:  9/11/2018           After Visit Summary Signature Page     I have received my discharge instructions, and my questions have been answered. I have discussed any challenges I see with this plan with the nurse or doctor.    ..........................................................................................................................................  Patient/Patient Representative Signature      ..........................................................................................................................................  Patient Representative Print Name and Relationship to Patient    ..................................................               ................................................  Date                                            Time    ..........................................................................................................................................  Reviewed by Signature/Title    ...................................................              ..............................................  Date                                                            Time          22EPIC Rev 08/18

## 2018-09-11 NOTE — ED AVS SNAPSHOT
[facility] Emergency Department    [address] [place] [address]    Phone:  [phone]    Fax:  [phone]                                       Christopher Euceda Jr.   MRN: [id number]    Department:  [facility] Emergency Department   Date of Visit:  9/11/2018           Patient Information     Date Of Birth          1980        Your diagnoses for this visit were:     Right-sided chest wall pain posterior lower rib area pain       You were seen by Vince Aquino DO.      Follow-up Information     Follow up with Your clinic.    Why:  As needed, if not improved in 10-14 days        Follow up with [facility] Emergency Department.    Specialty:  EMERGENCY MEDICINE    Why:  If symptoms worsen    Contact information:    [address]  [place] [address] [phone]    Additional information:    From [address]: Exit at [address] on south side of [place]. Turn right on [address]. Turn left at stoplight on [address]. [facility] will be in view two blocks ahead        Discharge Instructions       Please read and follow the handout(s) instructions. Return, if needed, for increased or worsening symptoms and as directed by the handout(s).    It is important for you to ice the area of pain/spasm if not using the Lidoderm pain patch. Use the ice for 10-15 minutes every 1-2 hours as needed for the pain. Gently stretch the area after the ice while the area is still cold. Swim or walk daily. This will help prevent the muscle from weakening which will eventually cause more risk of spasm/pain. Take your medications as directed only (see the med list).  Return, if needed, for increased symptoms as directed your handout(s).     Vince Aquino DO        Discharge References/Attachments     BACK SPRAIN/STRAIN (ENGLISH)      24 Hour Appointment Hotline       To make an appointment at any [facility], call [phone]  ([phone]). If you don't have a family doctor or clinic, we will help you find one. [facility] are conveniently located to serve the needs of you and your family.             Review of your medicines      START taking        Dose / Directions Last dose taken    lidocaine 5 % Patch   Commonly known as:  LIDODERM   Dose:  1 patch   Quantity:  6 patch        Place 1 patch onto the skin every 24 hours   Refills:  1        meloxicam 15 MG tablet   Commonly known as:  MOBIC   Dose:  15 mg   Quantity:  10 tablet        Take 1 tablet (15 mg) by mouth daily for 10 days TAKE WITH FOOD AS NEEDED FOR PAIN. WEAN OFF OF THE MEDICATIONS AS YOUR SYMPTOMS IMPROVE.   Refills:  0          Our records show that you are taking the medicines listed below. If these are incorrect, please call your family doctor or clinic.        Dose / Directions Last dose taken    IBUPROFEN PO   Dose:  600 mg        Take 600 mg by mouth   Refills:  0                Prescriptions were sent or printed at these locations (2 Prescriptions)                   [facility] - [address] [address]    Telephone:  [phone]   Fax:  [phone]   Hours:                  Printed at Department/Unit printer (2 of 2)         lidocaine (LIDODERM) 5 % Patch               meloxicam (MOBIC) 15 MG tablet                Procedures and tests performed during your visit     Ribs XR, unilat 3 views + PA chest, right      Orders Needing Specimen Collection     None      Pending Results     Date and Time Order Name Status Description    9/11/2018 0718 Ribs XR, unilat 3 views + PA chest, right Preliminary             Pending Culture Results     No orders found from 9/9/2018 to 9/12/2018.            Pending Results Instructions     If you had any lab results that were not finalized at the time of your Discharge, you can call the ED Lab Result RN at [phone]. You will be contacted by this  "team for any positive Lab results or changes in treatment. The nurses are available 7 days a week from 10A to 6:30P.  You can leave a message 24 hours per day and they will return your call.        Thank you for choosing [facility]       Thank you for choosing [facility] for your care. Our goal is always to provide you with excellent care. Hearing back from our patients is one way we can continue to improve our services. Please take a few minutes to complete the written survey that you may receive in the mail after you visit with us. Thank you!        [employer] Information     [email or web address] lets you send messages to your doctor, view your test results, renew your prescriptions, schedule appointments and more. To sign up, go to [email or web address] . Click on \"Log in\" on the left side of the screen, which will take you to the Welcome page. Then click on \"Sign up Now\" on the right side of the page.     You will be asked to enter the access code listed below, as well as some personal information. Please follow the directions to create your username and password.     Your access code is: [email or web address]  Expires: 12/10/2018  7:55 AM     Your access code will  in 90 days. If you need help or a new code, please call your [facility] clinic or [phone].        Care EveryWhere ID     This is your Care EveryWhere ID. This could be used by other organizations to access your [facility] medical records  [id number]        Equal Access to Services     MAGGY MASSEY : Hadii yg butchero Somarry, waaxda luqadaha, qaybta kaalmada adetoniayada, rolanda kamara . So [facility] [phone].    ATENCIÓN: Si habla español, tiene a gonzales disposición servicios gratuitos de asistencia lingüística. Pat al [phone].    We comply with applicable federal civil rights laws and [place] laws. We do not discriminate on the basis of race, color, national origin, age, disability, sex, sexual orientation, or gender identity.       "      After Visit Summary       This is your record. Keep this with you and show to your community pharmacist(s) and doctor(s) at your next visit.

## 2018-11-27 ENCOUNTER — OFFICE VISIT (OUTPATIENT)
Dept: FAMILY MEDICINE | Facility: CLINIC | Age: 38
End: 2018-11-27
Payer: COMMERCIAL

## 2018-11-27 VITALS
RESPIRATION RATE: 16 BRPM | SYSTOLIC BLOOD PRESSURE: 100 MMHG | TEMPERATURE: 98 F | BODY MASS INDEX: 33.67 KG/M2 | HEART RATE: 74 BPM | WEIGHT: 238 LBS | OXYGEN SATURATION: 97 % | DIASTOLIC BLOOD PRESSURE: 64 MMHG

## 2018-11-27 DIAGNOSIS — D17.30 LIPOMA OF SKIN AND SUBCUTANEOUS TISSUE: Primary | ICD-10-CM

## 2018-11-27 PROCEDURE — 99213 OFFICE O/P EST LOW 20 MIN: CPT | Performed by: OBSTETRICS & GYNECOLOGY

## 2018-11-27 ASSESSMENT — PAIN SCALES - GENERAL: PAINLEVEL: MILD PAIN (2)

## 2018-11-27 NOTE — PROGRESS NOTES
Subjective: he has had several lumps for at least the few months, possible over a year. The left hip mass has been at least 6 months, the right shoulder mass at least 2 months, possibly much longer. Neither one has changed in size.      The past medical history, social history, past surgical history and family history as shown below have been reviewed by me today.  Past Medical History:   Diagnosis Date     Cellulitis, face 10/03/2012        Allergies   Allergen Reactions     No Known Allergies      Current Outpatient Prescriptions   Medication Sig Dispense Refill     IBUPROFEN PO Take 600 mg by mouth       lidocaine (LIDODERM) 5 % Patch Place 1 patch onto the skin every 24 hours 6 patch 1     meloxicam (MOBIC) 15 MG tablet Take 1 tablet (15 mg) by mouth daily for 10 days TAKE WITH FOOD AS NEEDED FOR PAIN. WEAN OFF OF THE MEDICATIONS AS YOUR SYMPTOMS IMPROVE. 10 tablet 0     No past surgical history on file.  Social History     Social History     Marital status: Single     Spouse name: N/A     Number of children: N/A     Years of education: N/A     Social History Main Topics     Smoking status: Current Every Day Smoker     Packs/day: 0.75     Smokeless tobacco: Former User     Alcohol use No      Comment: sober since 01/2018     Drug use: No     Sexual activity: Yes     Partners: Female     Other Topics Concern     Not on file     Social History Narrative     Family History   Problem Relation Age of Onset     GASTROINTESTINAL DISEASE Father      crohns     Cardiovascular Father      MI       ROS: A 12 point review of systems was done. Except for what is listed above in the HPI, the systems review is negative .      Objective: Vital signs: Blood pressure 100/64, pulse 74, temperature 98  F (36.7  C), temperature source Temporal, resp. rate 16, weight 238 lb (108 kg), SpO2 97 %.    Chest is clear to auscultation.  No wheezes, rales or rhonchi heard.  Cardiac exam is normal with s1, s2, no murmurs or adventitious  sounds.Normal rate and rhythm is heard.     The left lateral hip has a soft tissue mass has well-circumscribed borders and it is the consistency of a lipoma.  It is about fist-sized .  It is not red or warm.    On the right shoulder he has a similar mass which is slightly smaller but has well-circumscribed edges consistent with a lipoma.  It is not red or warm.  Nontender to palpation.        Assessment/Plan:    1.  I suspect he has a lipoma of the left hip and of the right shoulder.  I will refer to Dr. Licona to consider removal of these masses.      ALEXANDER Caldwell MD

## 2018-11-27 NOTE — MR AVS SNAPSHOT
After Visit Summary   11/27/2018    Christopher Euceda Jr.    MRN: [id number]           Patient Information     Date Of Birth          1980        Visit Information        Provider Department      11/27/2018 11:30 AM Charbel Caldwell MD [facility]        Today's Diagnoses     Lipoma of skin and subcutaneous tissue    -  1       Follow-ups after your visit        Additional Services     GENERAL SURG ADULT REFERRAL       Your provider has referred you to: [facility]: [facility] [phone]   [email or web address]    Please be aware that coverage of these services is subject to the terms and limitations of your health insurance plan.  Call member services at your health plan with any benefit or coverage questions.      Please bring the following with you to your appointment:    (1) Any X-Rays, CTs or MRIs which have been performed.  Contact the facility where they were done to arrange for  prior to your scheduled appointment.   (2) List of current medications   (3) This referral request   (4) Any documents/labs given to you for this referral                  Follow-up notes from your care team     Return in about 1 year (around 11/27/2019) for Routine Visit.      Your next 10 appointments already scheduled     Nov 28, 2018  8:30 AM CST   New Visit with Keny Licona,    [facility]    [address] [place] [address] [phone]              Who to contact     If you have questions or need follow up information about today's clinic visit or your schedule please contact [facility] directly at [phone].  Normal or non-critical lab and imaging results will be communicated to you by MyChart, letter or phone within 4 business days after the clinic has received the results. If you do not hear from us within 7 days, please contact the clinic through  "[employer] or phone. If you have a critical or abnormal lab result, we will notify you by phone as soon as possible.  Submit refill requests through [employer] or call your pharmacy and they will forward the refill request to us. Please allow 3 business days for your refill to be completed.          Additional Information About Your Visit        [employer]art Information     [employer] lets you send messages to your doctor, view your test results, renew your prescriptions, schedule appointments and more. To sign up, go to [email or web address] . Click on \"Log in\" on the left side of the screen, which will take you to the Welcome page. Then click on \"Sign up Now\" on the right side of the page.     You will be asked to enter the access code listed below, as well as some personal information. Please follow the directions to create your username and password.     Your access code is: [email or web address]  Expires: 12/10/2018  6:55 AM     Your access code will  in 90 days. If you need help or a new code, please call your [facility] clinic or [phone].        Care EveryWhere ID     This is your Care EveryWhere ID. This could be used by other organizations to access your [facility] medical records  [id number]        Your Vitals Were     Pulse Temperature Respirations Pulse Oximetry BMI (Body Mass Index)       74 98  F (36.7  C) (Temporal) 16 97% 33.67 kg/m2        Blood Pressure from Last 3 Encounters:   18 100/64   18 112/64   18 116/85    Weight from Last 3 Encounters:   18 238 lb (108 kg)   18 235 lb (106.6 kg)   18 229 lb 3 oz (104 kg)              We Performed the Following     GENERAL SURG ADULT REFERRAL        Primary Care Provider Fax #    Physician No Ref-Primary [phone]       No address on file        Equal Access to Services     MAGGY MASSEY : Marjorie Romero, eris casillas, rolanda mosley. So [facility] " [phone].    ATENCIÓN: Si srikanth ruiz, tiene a gonzales disposición servicios gratuitos de asistencia lingüística. Pat al [phone].    We comply with applicable federal civil rights laws and [place] laws. We do not discriminate on the basis of race, color, national origin, age, disability, sex, sexual orientation, or gender identity.            Thank you!     Thank you for choosing [facility]  for your care. Our goal is always to provide you with excellent care. Hearing back from our patients is one way we can continue to improve our services. Please take a few minutes to complete the written survey that you may receive in the mail after your visit with us. Thank you!             Your Updated Medication List - Protect others around you: Learn how to safely use, store and throw away your medicines at www.disposemymeds.org.          This list is accurate as of 11/27/18  2:32 PM.  Always use your most recent med list.                   Brand Name Dispense Instructions for use Diagnosis    IBUPROFEN PO      Take 600 mg by mouth        lidocaine 5 % patch    LIDODERM    6 patch    Place 1 patch onto the skin every 24 hours    Right-sided chest wall pain

## 2018-11-28 ENCOUNTER — OFFICE VISIT (OUTPATIENT)
Dept: SURGERY | Facility: CLINIC | Age: 38
End: 2018-11-28
Payer: COMMERCIAL

## 2018-11-28 ENCOUNTER — HOSPITAL ENCOUNTER (OUTPATIENT)
Dept: MRI IMAGING | Facility: CLINIC | Age: 38
Discharge: HOME | End: 2018-11-28
Attending: SURGERY | Admitting: SURGERY
Payer: COMMERCIAL

## 2018-11-28 ENCOUNTER — HOSPITAL ENCOUNTER (OUTPATIENT)
Dept: MRI IMAGING | Facility: CLINIC | Age: 38
End: 2018-11-28
Attending: SURGERY
Payer: COMMERCIAL

## 2018-11-28 VITALS
WEIGHT: 242 LBS | TEMPERATURE: 98.3 F | OXYGEN SATURATION: 96 % | BODY MASS INDEX: 33.88 KG/M2 | RESPIRATION RATE: 16 BRPM | DIASTOLIC BLOOD PRESSURE: 70 MMHG | HEIGHT: 71 IN | HEART RATE: 75 BPM | SYSTOLIC BLOOD PRESSURE: 116 MMHG

## 2018-11-28 DIAGNOSIS — M79.89 MASS OF SOFT TISSUE: ICD-10-CM

## 2018-11-28 DIAGNOSIS — M79.89 MASS OF SOFT TISSUE: Primary | ICD-10-CM

## 2018-11-28 PROCEDURE — 73221 MRI JOINT UPR EXTREM W/O DYE: CPT | Mod: RT

## 2018-11-28 PROCEDURE — 99243 OFF/OP CNSLTJ NEW/EST LOW 30: CPT | Performed by: SURGERY

## 2018-11-28 PROCEDURE — 73721 MRI JNT OF LWR EXTRE W/O DYE: CPT | Mod: LT

## 2018-11-28 ASSESSMENT — PAIN SCALES - GENERAL: PAINLEVEL: NO PAIN (0)

## 2018-11-28 NOTE — MR AVS SNAPSHOT
After Visit Summary   11/28/2018    Christopher Euceda Jr.    MRN: [id number]           Patient Information     Date Of Birth          1980        Visit Information        Provider Department      11/28/2018 8:30 AM Keny Licona, DO [facility]        Today's Diagnoses     Mass of soft tissue    -  1       Follow-ups after your visit        Your next 10 appointments already scheduled     Nov 28, 2018  2:00 PM CST   MR HIP LEFT W CONTRAST with PHMR1   [facility] ([facility])    [address] [place] [id number] [phone]           How do I prepare for my exam? (Food and drink instructions) **If you will be receiving sedation or general anesthesia, please see special notes below.**  How do I prepare for my exam? (Other instructions) Take your medicines as usual, unless your doctor tells you not to. You may or may not receive intravenous (IV) contrast for this exam pending the discretion of the Radiologist.  You do not need to do anything special to prepare.  **If you will be receiving sedation or general anesthesia, please see special notes below.**  What should I wear: The MRI machine uses a strong magnet. Please wear clothes without metal (snaps, zippers). A sweatsuit works well, or we may give you a hospital gown. Please remove any body piercings and hair extensions before you arrive. You will also remove watches, jewelry, hairpins, wallets, dentures, partial dental plates and hearing aids. You may wear contact lenses, and you may be able to wear your rings. We have a safe place to keep your personal items, but it is safer to leave them at home.  How long does the exam take: Most tests take 30 to 60 minutes.  HOWEVER, IF YOUR DOCTOR PRESCRIBES ANESTHESIA please plan on spending four to five hours in the recovery room.  What should I bring:  Bring a list of your current medicines to your exam (including vitamins, minerals and  over-the-counter drugs).  Do I need a :  **If you will be receiving sedation or general anesthesia, please see special notes below.**  What should I do after the exam: No Restrictions, You may resume normal activities.  What is this test: MRI (magnetic resonance imaging) uses a strong magnet and radio waves to look inside the body. An MRA (magnetic resonance angiogram) does the same thing, but it lets us look at your blood vessels. A computer turns the radio waves into pictures showing cross sections of the body, much like slices of bread. This helps us see any problems more clearly. You may receive fluid (called  contrast ) before or during your scan. The fluid helps us see the pictures better. We give the fluid through an IV (small needle in your arm).  Who should I call with questions:  Please call the Imaging Department at your exam site with any questions. Directions, parking instructions, and other information is available on our website, [email or web address].  How do I prepare if I m having sedation or anesthesia? **IMPORTANT** THE INSTRUCTIONS BELOW ARE ONLY FOR THOSE PATIENTS WHO HAVE BEEN TOLD THEY WILL RECEIVE SEDATION OR GENERAL ANESTHESIA DURING THEIR MRI PROCEDURE:  IF YOU WILL RECEIVE SEDATION (take medicine to help you relax during your exam): You must get the medicine from your doctor before you arrive. Bring the medicine to the exam. Do not take it at home. Arrive one hour early. Bring someone who can take you home after the test. Your medicine will make you sleepy. After the exam, you may not drive, take a bus or take a taxi by yourself. No eating 8 hours before your exam. You may have clear liquids up until 4 hours before your exam. (Clear liquids include water, clear tea, black coffee and fruit juice without pulp.)  IF YOU WILL RECEIVE ANESTHESIA (be asleep for your exam): Arrive 1 1/2 hours early. Bring someone who can take you home after the test. You may not drive, take a bus or take a  taxi by yourself. No eating 8 hours before your exam. You may have clear liquids up until 4 hours before your exam. (Clear liquids include water, clear tea, black coffee and fruit juice without pulp.)            Nov 28, 2018  2:45 PM CST   MR SHOULDER RIGHT W CONTRAST with PHMR1   [facility] ([facility])    [address] [place] [id number]   [phone]           How do I prepare for my exam? (Food and drink instructions) **If you will be receiving sedation or general anesthesia, please see special notes below.**  How do I prepare for my exam? (Other instructions) Take your medicines as usual, unless your doctor tells you not to. You may or may not receive intravenous (IV) contrast for this exam pending the discretion of the Radiologist.  You do not need to do anything special to prepare.  **If you will be receiving sedation or general anesthesia, please see special notes below.**  What should I wear: The MRI machine uses a strong magnet. Please wear clothes without metal (snaps, zippers). A sweatsuit works well, or we may give you a hospital gown. Please remove any body piercings and hair extensions before you arrive. You will also remove watches, jewelry, hairpins, wallets, dentures, partial dental plates and hearing aids. You may wear contact lenses, and you may be able to wear your rings. We have a safe place to keep your personal items, but it is safer to leave them at home.  How long does the exam take: Most tests take 30 to 60 minutes.  HOWEVER, IF YOUR DOCTOR PRESCRIBES ANESTHESIA please plan on spending four to five hours in the recovery room.  What should I bring:  Bring a list of your current medicines to your exam (including vitamins, minerals and over-the-counter drugs).  Do I need a :  **If you will be receiving sedation or general anesthesia, please see special notes below.**  What should I do after the exam: No Restrictions, You may resume normal  activities.  What is this test: MRI (magnetic resonance imaging) uses a strong magnet and radio waves to look inside the body. An MRA (magnetic resonance angiogram) does the same thing, but it lets us look at your blood vessels. A computer turns the radio waves into pictures showing cross sections of the body, much like slices of bread. This helps us see any problems more clearly. You may receive fluid (called  contrast ) before or during your scan. The fluid helps us see the pictures better. We give the fluid through an IV (small needle in your arm).  Who should I call with questions:  Please call the Imaging Department at your exam site with any questions. Directions, parking instructions, and other information is available on our website, [email or web address].  How do I prepare if I m having sedation or anesthesia? **IMPORTANT** THE INSTRUCTIONS BELOW ARE ONLY FOR THOSE PATIENTS WHO HAVE BEEN TOLD THEY WILL RECEIVE SEDATION OR GENERAL ANESTHESIA DURING THEIR MRI PROCEDURE:  IF YOU WILL RECEIVE SEDATION (take medicine to help you relax during your exam): You must get the medicine from your doctor before you arrive. Bring the medicine to the exam. Do not take it at home. Arrive one hour early. Bring someone who can take you home after the test. Your medicine will make you sleepy. After the exam, you may not drive, take a bus or take a taxi by yourself. No eating 8 hours before your exam. You may have clear liquids up until 4 hours before your exam. (Clear liquids include water, clear tea, black coffee and fruit juice without pulp.)  IF YOU WILL RECEIVE ANESTHESIA (be asleep for your exam): Arrive 1 1/2 hours early. Bring someone who can take you home after the test. You may not drive, take a bus or take a taxi by yourself. No eating 8 hours before your exam. You may have clear liquids up until 4 hours before your exam. (Clear liquids include water, clear tea, black coffee and fruit juice without pulp.)             "  Future tests that were ordered for you today     Open Future Orders        Priority Expected Expires Ordered    MR Hip Left w Contrast Routine  2019    MR Shoulder Right w Contrast Routine  2019            Who to contact     If you have questions or need follow up information about today's clinic visit or your schedule please contact [facility] directly at [phone].  Normal or non-critical lab and imaging results will be communicated to you by [employer], letter or phone within 4 business days after the clinic has received the results. If you do not hear from us within 7 days, please contact the clinic through [employer] or phone. If you have a critical or abnormal lab result, we will notify you by phone as soon as possible.  Submit refill requests through [employer] or call your pharmacy and they will forward the refill request to us. Please allow 3 business days for your refill to be completed.          Additional Information About Your Visit        [employer] Information     [employer] lets you send messages to your doctor, view your test results, renew your prescriptions, schedule appointments and more. To sign up, go to [email or web address] . Click on \"Log in\" on the left side of the screen, which will take you to the Welcome page. Then click on \"Sign up Now\" on the right side of the page.     You will be asked to enter the access code listed below, as well as some personal information. Please follow the directions to create your username and password.     Your access code is: [email or web address]  Expires: 12/10/2018  6:55 AM     Your access code will  in 90 days. If you need help or a new code, please call your [place] clinic or [phone].        Care EveryWhere ID     This is your Care EveryWhere ID. This could be used by other organizations to access your [place] medical records  [id number]        Your Vitals Were     Pulse Temperature Respirations Height " "Pulse Oximetry BMI (Body Mass Index)    75 98.3  F (36.8  C) (Temporal) 16 1.803 m (5' 11\") 96% 33.75 kg/m2       Blood Pressure from Last 3 Encounters:   11/28/18 116/70   11/27/18 100/64   09/11/18 112/64    Weight from Last 3 Encounters:   11/28/18 109.8 kg (242 lb)   11/27/18 108 kg (238 lb)   09/11/18 106.6 kg (235 lb)               Primary Care Provider Fax #    Physician No Ref-Primary [phone]       No address on file        Equal Access to Services     D[facility]ESH [facility] : Hadpushpa Romero, eris casillas, scooby nunes, rolanda kamara . So [facility] [phone].    ATENCIÓN: Si habla español, tiene a gonzales disposición servicios gratuitos de asistencia lingüística. Llame al [phone].    We comply with applicable federal civil rights laws and [place] laws. We do not discriminate on the basis of race, color, national origin, age, disability, sex, sexual orientation, or gender identity.            Thank you!     Thank you for choosing [facility]  for your care. Our goal is always to provide you with excellent care. Hearing back from our patients is one way we can continue to improve our services. Please take a few minutes to complete the written survey that you may receive in the mail after your visit with us. Thank you!             Your Updated Medication List - Protect others around you: Learn how to safely use, store and throw away your medicines at www.disposemymeds.org.      Notice  As of 11/28/2018  8:42 AM    You have not been prescribed any medications.      "

## 2018-11-28 NOTE — LETTER
11/28/2018         RE: Christopher Euceda Jr.  [address] [facility] [facility] [address]        Dear Colleague,    Thank you for referring your patient, Christopher Euceda Jr., to the [facility]. Please see a copy of my visit note below.    Patient seen in consultation for soft tissue masses by Raudel Caldwell    HPI:  Patient is a 37 year old male with two areas of concern. One is a lump on his right shoulder that he states just became noticeable 2-3 months ago. He states it is growing rapidly. It does not especially hurt. He denies recent trauma to the area but in the past has injured that shoulder. No skin changes. He also has a lump on his left hip that has been there for almost a year. This one he says is a result of a hip injury after sledding last winter. He again denies any skin changes. He denies any family history of lipomas or other cancers.    Review Of Systems    Skin: as above  Ears/Nose/Throat: negative  Respiratory: No shortness of breath, dyspnea on exertion, cough, or hemoptysis  Cardiovascular: negative  Gastrointestinal: negative  Genitourinary: negative  Musculoskeletal: negative  Neurologic: negative  Hematologic/Lymphatic/Immunologic: negative  Endocrine: negative      Past Medical History:   Diagnosis Date     Cellulitis, face 10/03/2012       No past surgical history on file.    Family History   Problem Relation Age of Onset     GASTROINTESTINAL DISEASE Father      crohns     Cardiovascular Father      MI       Social History     Social History     Marital status: Single     Spouse name: N/A     Number of children: N/A     Years of education: N/A     Occupational History     Not on file.     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       Medications and history reviewed    Physical exam:  Vitals: /70 (BP Location: Right arm, Patient Position: Chair, Cuff Size: Adult Large)  Pulse 75  Temp 98.3  F (36.8  C) (Temporal)  Resp 16  Ht 1.803 m (5' 11\")  Wt 109.8 kg (242 lb)  SpO2 96%  BMI 33.75 kg/m2  BMI= Body mass index is 33.75 kg/(m^2).    Constitutional: Healthy, alert, non-distressed   Head: Normo-cephalic, atraumatic, no lesions, masses or tenderness   Cardiovascular: RRR, no new murmurs, +S1, +S2 heart sounds, no clicks, rubs or gallops   Respiratory: CTAB, no rales, rhonchi or wheezing, equal chest rise, good respiratory effort   Gastrointestinal: Soft, non-tender, non distended, no rebound rigidity or guarding, no masses or hernias palpated   : Deferred  Musculoskeletal: Moves all extremities, normal  strength, no deformities noted   Skin: Right shoulder with ~7cm soft tissue mass, soft, mobile, no skin changes. Left hip/anterior thigh with ~10cm x 6xm soft tissue mass, soft, mobile, no skin changes  Psychiatric: Mentation appears normal, affect appropriate   Hematologic/Lymphatic/Immunologic: Normal cervical and supraclavicular lymph nodes   Patient able to get up on table without difficulty.    Labs show:  No results found for this or any previous visit (from the past 24 hour(s)).      Assessment:     ICD-10-CM    1. Mass of soft tissue M79.9 MR Hip Left w Contrast     MR Shoulder Right w Contrast     Plan: Given the size of these masses I recommend imaging prior to any surgical intervention. I explained that the vast majority of these spots are benign lipomatous masses, but in case they have concerning features or joint involvement it is prudent to know prior to any procedure. He understands and agrees. He will have the MRIs and we dill discuss the findings on the phone.    Keny Licona, DO      Again, thank you for allowing me to participate in the " care of your patient.        Sincerely,        Keny Licona, DO

## 2018-11-28 NOTE — PROGRESS NOTES
Patient seen in consultation for soft tissue masses by Raudel Caldwell    HPI:  Patient is a 37 year old male with two areas of concern. One is a lump on his right shoulder that he states just became noticeable 2-3 months ago. He states it is growing rapidly. It does not especially hurt. He denies recent trauma to the area but in the past has injured that shoulder. No skin changes. He also has a lump on his left hip that has been there for almost a year. This one he says is a result of a hip injury after sledding last winter. He again denies any skin changes. He denies any family history of lipomas or other cancers.    Review Of Systems    Skin: as above  Ears/Nose/Throat: negative  Respiratory: No shortness of breath, dyspnea on exertion, cough, or hemoptysis  Cardiovascular: negative  Gastrointestinal: negative  Genitourinary: negative  Musculoskeletal: negative  Neurologic: negative  Hematologic/Lymphatic/Immunologic: negative  Endocrine: negative      Past Medical History:   Diagnosis Date     Cellulitis, face 10/03/2012       No past surgical history on file.    Family History   Problem Relation Age of Onset     GASTROINTESTINAL DISEASE Father      crohns     Cardiovascular Father      MI       Social History     Social History     Marital status: Single     Spouse name: N/A     Number of children: N/A     Years of education: N/A     Occupational History     Not on file.     Social History Main Topics     Smoking status: Current Every Day Smoker     Packs/day: 0.75     Smokeless tobacco: Former User     Alcohol use No      Comment: sober since 01/2018     Drug use: No     Sexual activity: Yes     Partners: Female     Other Topics Concern     Not on file     Social History Narrative       No current outpatient prescriptions on file.       Medications and history reviewed    Physical exam:  Vitals: /70 (BP Location: Right arm, Patient Position: Chair, Cuff Size: Adult Large)  Pulse 75  Temp 98.3  F (36.8  " C) (Temporal)  Resp 16  Ht 1.803 m (5' 11\")  Wt 109.8 kg (242 lb)  SpO2 96%  BMI 33.75 kg/m2  BMI= Body mass index is 33.75 kg/(m^2).    Constitutional: Healthy, alert, non-distressed   Head: Normo-cephalic, atraumatic, no lesions, masses or tenderness   Cardiovascular: RRR, no new murmurs, +S1, +S2 heart sounds, no clicks, rubs or gallops   Respiratory: CTAB, no rales, rhonchi or wheezing, equal chest rise, good respiratory effort   Gastrointestinal: Soft, non-tender, non distended, no rebound rigidity or guarding, no masses or hernias palpated   : Deferred  Musculoskeletal: Moves all extremities, normal  strength, no deformities noted   Skin: Right shoulder with ~7cm soft tissue mass, soft, mobile, no skin changes. Left hip/anterior thigh with ~10cm x 6xm soft tissue mass, soft, mobile, no skin changes  Psychiatric: Mentation appears normal, affect appropriate   Hematologic/Lymphatic/Immunologic: Normal cervical and supraclavicular lymph nodes   Patient able to get up on table without difficulty.    Labs show:  No results found for this or any previous visit (from the past 24 hour(s)).      Assessment:     ICD-10-CM    1. Mass of soft tissue M79.9 MR Hip Left w Contrast     MR Shoulder Right w Contrast     Plan: Given the size of these masses I recommend imaging prior to any surgical intervention. I explained that the vast majority of these spots are benign lipomatous masses, but in case they have concerning features or joint involvement it is prudent to know prior to any procedure. He understands and agrees. He will have the MRIs and we dill discuss the findings on the phone.    Keny Licona, DO    "

## 2018-11-29 DIAGNOSIS — M79.89 MASS OF SOFT TISSUE OF RIGHT UPPER EXTREMITY: Primary | ICD-10-CM

## 2018-12-10 ENCOUNTER — HOSPITAL ENCOUNTER (OUTPATIENT)
Dept: ULTRASOUND IMAGING | Facility: CLINIC | Age: 38
Discharge: HOME | End: 2018-12-10
Attending: SURGERY | Admitting: SURGERY
Payer: COMMERCIAL

## 2018-12-10 DIAGNOSIS — M79.89 MASS OF SOFT TISSUE OF RIGHT UPPER EXTREMITY: ICD-10-CM

## 2018-12-10 PROCEDURE — 76942 ECHO GUIDE FOR BIOPSY: CPT

## 2018-12-10 PROCEDURE — 88275 CYTOGENETICS 100-300: CPT | Performed by: PATHOLOGY

## 2018-12-10 PROCEDURE — 88305 TISSUE EXAM BY PATHOLOGIST: CPT | Performed by: SURGERY

## 2018-12-10 PROCEDURE — 88341 IMHCHEM/IMCYTCHM EA ADD ANTB: CPT | Performed by: SURGERY

## 2018-12-10 PROCEDURE — 88305 TISSUE EXAM BY PATHOLOGIST: CPT | Mod: 26 | Performed by: SURGERY

## 2018-12-10 PROCEDURE — 00000159 ZZHCL STATISTIC H-SEND OUTS PREP: Performed by: SURGERY

## 2018-12-10 PROCEDURE — 88271 CYTOGENETICS DNA PROBE: CPT | Performed by: PATHOLOGY

## 2018-12-10 PROCEDURE — 88342 IMHCHEM/IMCYTCHM 1ST ANTB: CPT | Performed by: SURGERY

## 2018-12-10 RX ORDER — LIDOCAINE HYDROCHLORIDE 10 MG/ML
10 INJECTION, SOLUTION EPIDURAL; INFILTRATION; INTRACAUDAL; PERINEURAL ONCE
Status: COMPLETED | OUTPATIENT
Start: 2018-12-10 | End: 2018-12-10

## 2018-12-10 RX ADMIN — LIDOCAINE HYDROCHLORIDE 1.7 ML: 10 INJECTION, SOLUTION EPIDURAL; INFILTRATION; INTRACAUDAL; PERINEURAL at 11:31

## 2018-12-19 ENCOUNTER — TELEPHONE (OUTPATIENT)
Dept: SURGERY | Facility: CLINIC | Age: 38
End: 2018-12-19

## 2018-12-19 DIAGNOSIS — D17.9 MULTIPLE LIPOMAS: Primary | ICD-10-CM

## 2018-12-19 LAB — COPATH REPORT: NORMAL

## 2018-12-19 NOTE — TELEPHONE ENCOUNTER
Reason for Call:  Other call back    Detailed comments: Patient is calling for results of tissue samples taken from his shoulder.  Please call his cell phone, he is at work and may not answer, ok to leave results on voicemail.    Phone Number Patient can be reached at: Home number on file [phone] (home) or Cell number on file:    Telephone Information:   Mobile [phone]       Best Time: any    Can we leave a detailed message on this number? YES    Call taken on 12/19/2018 at 12:48 PM by Jeane Pahn

## 2018-12-20 NOTE — TELEPHONE ENCOUNTER
I did call Christopher to let him know that the preliminary results indicated that this is a simple lipoma. We are still waiting on final tissue pathology and I will call him back once we get those results.

## 2018-12-21 ENCOUNTER — HOSPITAL PATHOLOGY (OUTPATIENT)
Dept: OTHER | Facility: CLINIC | Age: 38
End: 2018-12-21

## 2018-12-24 LAB — COPATH REPORT: NORMAL

## 2018-12-26 LAB — COPATH REPORT: NORMAL

## 2018-12-31 NOTE — TELEPHONE ENCOUNTER
Patient called back stating he just missed a call from this number.  He is at work and can not answer his phone.  He said go ahead and schedule and he will come in for the procedure.  Thanks

## 2019-01-02 ENCOUNTER — TELEPHONE (OUTPATIENT)
Dept: SURGERY | Facility: CLINIC | Age: 39
End: 2019-01-02

## 2019-01-02 ENCOUNTER — HOSPITAL ENCOUNTER (OUTPATIENT)
Facility: CLINIC | Age: 39
End: 2019-01-02
Attending: SURGERY | Admitting: SURGERY

## 2019-01-02 NOTE — TELEPHONE ENCOUNTER
Type of surgery: excision of lipomas of left hip and right shoulder  Location of surgery: [facility]  Date and time of surgery: 1/11  Surgeon: Monae  Pre-Op Appt Date: 1/10  Post-Op Appt Date: 1/21   Packet sent out: Yes  Pre-cert/Authorization completed:  Not Applicable  Date: na

## 2019-01-02 NOTE — TELEPHONE ENCOUNTER
Spoke to Christopher on the phone about his results. I recommend excision of his two large lipomas based on size and symptoms. This is something I think can be done here in [place]. I did recommend general anesthesia due to the size and number of lipomas being excised. He understands and agrees. I will have scheduling dept get a hold of him to schedule.

## 2019-01-21 NOTE — TELEPHONE ENCOUNTER
Left message for return call to reschedule surgery    Procedure name(s) - multi select excision of lipomas of left hip and right shoulder    Laterality N/A    Reason for procedure lipomas of the left hip and right shoulder    Location of Case: [facility]    Surgeon Procedure Time (incision to closure) in minutes (per procedure as applicable) 75    Note:  Surgical Case Time Needed (in minutes)   Patient Class (for admit prior to surgery, specify number of days in comments): Same day (surgery center outpatient)    Anesthesia

## 2022-01-11 ENCOUNTER — HOSPITAL ENCOUNTER (EMERGENCY)
Facility: CLINIC | Age: 42
Discharge: HOME | End: 2022-01-11
Attending: PHYSICIAN ASSISTANT | Admitting: PHYSICIAN ASSISTANT

## 2022-01-11 VITALS
DIASTOLIC BLOOD PRESSURE: 100 MMHG | BODY MASS INDEX: 33.47 KG/M2 | TEMPERATURE: 97.6 F | SYSTOLIC BLOOD PRESSURE: 141 MMHG | OXYGEN SATURATION: 96 % | RESPIRATION RATE: 18 BRPM | HEART RATE: 100 BPM | WEIGHT: 240 LBS

## 2022-01-11 DIAGNOSIS — L02.31 LEFT BUTTOCK ABSCESS: ICD-10-CM

## 2022-01-11 PROCEDURE — 10060 I&D ABSCESS SIMPLE/SINGLE: CPT | Performed by: PHYSICIAN ASSISTANT

## 2022-01-11 PROCEDURE — 99284 EMERGENCY DEPT VISIT MOD MDM: CPT | Mod: 25 | Performed by: PHYSICIAN ASSISTANT

## 2022-01-11 PROCEDURE — 87205 SMEAR GRAM STAIN: CPT | Performed by: PHYSICIAN ASSISTANT

## 2022-01-11 PROCEDURE — 99283 EMERGENCY DEPT VISIT LOW MDM: CPT | Mod: 25 | Performed by: PHYSICIAN ASSISTANT

## 2022-01-11 RX ORDER — BUPIVACAINE HYDROCHLORIDE 5 MG/ML
10 INJECTION, SOLUTION EPIDURAL; INTRACAUDAL ONCE
Status: DISCONTINUED | OUTPATIENT
Start: 2022-01-11 | End: 2022-01-11 | Stop reason: HOSPADM

## 2022-01-11 RX ORDER — CLINDAMYCIN HCL 300 MG
300 CAPSULE ORAL 4 TIMES DAILY
Qty: 40 CAPSULE | Refills: 0 | Status: SHIPPED | OUTPATIENT
Start: 2022-01-11 | End: 2022-01-21

## 2022-01-11 NOTE — DISCHARGE INSTRUCTIONS
It was a pleasure working with you today!  I hope your condition improves rapidly!     Thankfully, we were able to get a large amount of drainage out of your abscess.  The dressing that was placed inside the skin will slowly come out over the next 4-5 days.  It is important to keep this skin wound open for the next few days so that the drainage has an ability to get out.  I would sit in the bathtub for 20 minutes every 1-2 hours the remainder of this evening.  Try to do it once prior to going to work tomorrow as well.  Sitting on a heating pad can help on break time.  Start the antibiotic right away given that the infection was slightly starting to infect the skin around the abscess.  Good hygiene and regular showering after work and any physical activity is the prevention for this type of issue.  If you have pain, you can use Tylenol 1000 mg every 6 hours as needed for pain and/or ibuprofen 600 mg every 6 hours as needed.  
assistive person

## 2022-01-12 NOTE — ED PROVIDER NOTES
History     Chief Complaint   Patient presents with     Cyst     HPI  Christopher Euceda Jr. is a 41 year old male who painful swelling of his left buttock over the past 3 days which is increasing in size.  Pain is currently rated 7 on a scale of 10 and feels like a dull aching pain.  Has a hard time sitting on the area now.  Denies any fever, chills, nausea, vomiting.  No rectal drainage or bleeding.  No painful bowel movements.  Regular BMs daily.  Denies any dysuria, frequency, urgency, or gross hematuria.  Has never had this type of issue before.  Denies a prior history of MRSA.  No injury to the area preceding symptom onset.          Allergies:  Allergies   Allergen Reactions     No Known Allergies        Problem List:    Patient Active Problem List    Diagnosis Date Noted     CARDIOVASCULAR SCREENING; LDL GOAL LESS THAN 160 10/15/2012     Priority: Medium     Facial cellulitis 10/03/2012     Priority: Medium        Past Medical History:    Past Medical History:   Diagnosis Date     Cellulitis, face 10/03/2012       Past Surgical History:    No past surgical history on file.    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      Physical Exam   BP: (!) 141/100  Pulse: 100  Temp: 97.6  F (36.4  C)  Resp: 18  Weight: 108.9 kg (240 lb)  SpO2: 96 %      Physical Exam  Vitals and nursing note reviewed.   Constitutional:       General: He is not in acute distress.     Appearance: He is not diaphoretic.   HENT:       Head: Normocephalic and atraumatic.      Right Ear: External ear normal.      Left Ear: External ear normal.      Nose: Nose normal.      Mouth/Throat:      Pharynx: No oropharyngeal exudate.   Eyes:      General: No scleral icterus.        Right eye: No discharge.         Left eye: No discharge.      Conjunctiva/sclera: Conjunctivae normal.      Pupils: Pupils are equal, round, and reactive to light.   Neck:      Thyroid: No thyromegaly.   Cardiovascular:      Rate and Rhythm: Normal rate and regular rhythm.      Heart sounds: Normal heart sounds. No murmur heard.      Pulmonary:      Effort: Pulmonary effort is normal. No respiratory distress.      Breath sounds: Normal breath sounds. No wheezing or rales.   Chest:      Chest wall: No tenderness.   Abdominal:      General: Bowel sounds are normal. There is no distension.      Palpations: Abdomen is soft. There is no mass.      Tenderness: There is no abdominal tenderness. There is no guarding or rebound.   Genitourinary:     Comments: Left buttock area below the gluteal cleft with a 8 x 10 cm area of induration, erythema, increased warmth, and fluctuance.  Tenderness noted.  Evidence for abscess.  No open area.  This is not in the area of a pilonidal cyst.  It is not in the perirectal space.  Musculoskeletal:      Cervical back: Normal range of motion and neck supple.   Lymphadenopathy:      Cervical: No cervical adenopathy.   Skin:     General: Skin is warm and dry.      Capillary Refill: Capillary refill takes less than 2 seconds.      Findings: No erythema or rash.   Neurological:      Mental Status: He is alert and oriented to person, place, and time.      Cranial Nerves: No cranial nerve deficit.   Psychiatric:         Behavior: Behavior normal.         Thought Content: Thought content normal.         ED [facility]    PROCEDURE: -Incision/Drainage    Date/Time: 1/11/2022 10:04 PM  Performed by:  "Jose Rodriguez PA-C  Authorized by: Jose Rodriguez PA-C     Risks, benefits and alternatives discussed.      LOCATION:      Type:  Abscess    Size:  8x10 cm    Location:  Anogenital    Anogenital location: left buttock.    PRE-PROCEDURE DETAILS:     Skin preparation:  Betadine    PROCEDURE TYPE:     Complexity:  Simple    ANESTHESIA (see MAR for exact dosages):     Anesthesia method:  Local infiltration    Local anesthetic:  Bupivacaine 0.5% w/o epi    PROCEDURE DETAILS:     Needle aspiration: no      Incision types:  Single straight    Incision depth:  Dermal    Scalpel blade:  11    Wound management:  Probed and deloculated    Drainage:  Purulent    Drainage amount:  Copious    Wound treatment:  Wound left open    Packing materials:  1/4 in iodoform gauze    Amount 1/4\" iodoform:  10 cm    PROCEDURE    Patient Tolerance:  Patient tolerated the procedure well with no immediate complications                Critical Care time:  none               No results found for this or any previous visit (from the past 24 hour(s)).    Medications - No data to display    Assessments & Plan (with Medical Decision Making)  Left buttock abscess     41 year old male presents for evaluation of an abscess of his buttock for the past 3 days.  Worsening.  No systemic symptoms.  See HPI above for details.  On exam he is afebrile with a temperature of 97.6.   8 x 10 cm abscess noted of the left buttock.  This is not a perirectal or pilonidal abscess.  Verbal consent obtained.  6 mL of 0.5% bupivacaine without epinephrine utilized for local anesthesia.  A 2 cm linear stab incision made with an 11 blade.  Deloculated.  Copious amounts of purulent material expressed.  He tolerated the procedure well.  10 cm of iodoform packing placed.  Wound culture obtained.  Patient does have some skin erythema surrounding and concern for cellulitis advancement.  Therefore, I am going to place him on clindamycin therapy.  Importance of " warm/hot water tub soaks reviewed with him.  The dressing should come out in the next few days.  He can pull out about 2 cm of the dressing daily.  Tylenol or ibuprofen as needed for pain.  ED return instructions reviewed with patient.  He was in agreement with this plan and was suitable for discharge.     I have reviewed the nursing notes.    I have reviewed the findings, diagnosis, plan and need for follow up with the patient.       Discharge Medication List as of 1/11/2022  4:23 PM      START taking these medications    Details   clindamycin (CLEOCIN) 300 MG capsule Take 1 capsule (300 mg) by mouth 4 times daily for 10 days, Disp-40 capsule, R-0, E-Prescribe             Final diagnoses:   Left buttock abscess     Disclaimer: This note consists of symbols derived from keyboarding, dictation and/or voice recognition software. As a result, there may be errors in the script that have gone undetected. Please consider this when interpreting information found in this chart.      1/11/2022   [facility] EMERGENCY DEPT     Jose Rodriguez PA-C  01/11/22 3483

## 2022-01-17 LAB
BACTERIA ABSC ANAEROBE+AEROBE CULT: ABNORMAL
BACTERIA ABSC ANAEROBE+AEROBE CULT: ABNORMAL
GRAM STAIN RESULT: ABNORMAL
GRAM STAIN RESULT: ABNORMAL

## 2022-06-06 NOTE — TELEPHONE ENCOUNTER
Called and left  for patient to return call. We do not appear to have current medical records on this patient. He's only really been seen in the ED. Please have patient fax his medical records to [phone]. BEFORE his office visit.   
Christopher returned call. He said he hasn't been seen anywhere else so there are no records that need to be transferred.     Thank you  oBla Martinez  Patient Representative    
no

## 2022-12-13 ENCOUNTER — APPOINTMENT (OUTPATIENT)
Dept: RADIOLOGY | Facility: HOSPITAL | Age: 42
DRG: 199 | End: 2022-12-13
Attending: EMERGENCY MEDICINE

## 2022-12-13 ENCOUNTER — HOSPITAL ENCOUNTER (INPATIENT)
Facility: HOSPITAL | Age: 42
LOS: 3 days | Discharge: HOME | DRG: 199 | End: 2022-12-16
Attending: EMERGENCY MEDICINE | Admitting: FAMILY MEDICINE

## 2022-12-13 ENCOUNTER — APPOINTMENT (OUTPATIENT)
Dept: CT IMAGING | Facility: HOSPITAL | Age: 42
DRG: 199 | End: 2022-12-13
Attending: NURSE PRACTITIONER

## 2022-12-13 DIAGNOSIS — S22.41XA CLOSED FRACTURE OF MULTIPLE RIBS OF RIGHT SIDE, INITIAL ENCOUNTER: Primary | ICD-10-CM
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
## 2022-12-13 DIAGNOSIS — S27.329A PULMONARY CONTUSION: ICD-10-CM

## 2022-12-13 DIAGNOSIS — K59.03 DRUG-INDUCED CONSTIPATION: ICD-10-CM

## 2022-12-13 DIAGNOSIS — J94.2 HEMOPNEUMOTHORAX ON RIGHT: ICD-10-CM

## 2022-12-13 DIAGNOSIS — S42.009A CLAVICLE FRACTURE: ICD-10-CM

## 2022-12-13 PROBLEM — S22.49XA MULTIPLE RIB FRACTURES: Status: ACTIVE | Noted: 2022-12-13

## 2022-12-13 LAB
ANION GAP SERPL CALCULATED.3IONS-SCNC: 11 MMOL/L (ref 7–15)
BASOPHILS # BLD AUTO: 0 10E3/UL (ref 0–0.2)
BASOPHILS NFR BLD AUTO: 0 %
BUN SERPL-MCNC: 12.8 MG/DL (ref 6–20)
CALCIUM SERPL-MCNC: 9.3 MG/DL (ref 8.6–10)
CHLORIDE SERPL-SCNC: 103 MMOL/L (ref 98–107)
CREAT SERPL-MCNC: 0.97 MG/DL (ref 0.67–1.17)
DEPRECATED HCO3 PLAS-SCNC: 25 MMOL/L (ref 22–29)
EOSINOPHIL # BLD AUTO: 0.1 10E3/UL (ref 0–0.7)
EOSINOPHIL NFR BLD AUTO: 0 %
ERYTHROCYTE [DISTWIDTH] IN BLOOD BY AUTOMATED COUNT: 12.6 % (ref 10–15)
GFR SERPL CREATININE-BSD FRML MDRD: >90 ML/MIN/1.73M2
GLUCOSE SERPL-MCNC: 124 MG/DL (ref 70–99)
HCT VFR BLD AUTO: 45 % (ref 40–53)
HGB BLD-MCNC: 15.5 G/DL (ref 13.3–17.7)
HOLD SPECIMEN: NORMAL
IMM GRANULOCYTES # BLD: 0.1 10E3/UL
IMM GRANULOCYTES NFR BLD: 1 %
LYMPHOCYTES # BLD AUTO: 1.1 10E3/UL (ref 0.8–5.3)
LYMPHOCYTES NFR BLD AUTO: 6 %
MCH RBC QN AUTO: 31.6 PG (ref 26.5–33)
MCHC RBC AUTO-ENTMCNC: 34.4 G/DL (ref 31.5–36.5)
MCV RBC AUTO: 92 FL (ref 78–100)
MONOCYTES # BLD AUTO: 0.8 10E3/UL (ref 0–1.3)
MONOCYTES NFR BLD AUTO: 4 %
NEUTROPHILS # BLD AUTO: 16.2 10E3/UL (ref 1.6–8.3)
NEUTROPHILS NFR BLD AUTO: 89 %
NRBC # BLD AUTO: 0 10E3/UL
NRBC BLD AUTO-RTO: 0 /100
PLATELET # BLD AUTO: 292 10E3/UL (ref 150–450)
POTASSIUM SERPL-SCNC: 3.8 MMOL/L (ref 3.4–5.3)
RBC # BLD AUTO: 4.9 10E6/UL (ref 4.4–5.9)
SARS-COV-2 RNA RESP QL NAA+PROBE: NEGATIVE
SODIUM SERPL-SCNC: 139 MMOL/L (ref 136–145)
WBC # BLD AUTO: 18.1 10E3/UL (ref 4–11)

## 2022-12-13 PROCEDURE — 999N000157 HC STATISTIC RCP TIME EA 10 MIN

## 2022-12-13 PROCEDURE — C9803 HOPD COVID-19 SPEC COLLECT: HCPCS

## 2022-12-13 PROCEDURE — 73030 X-RAY EXAM OF SHOULDER: CPT | Mod: RT

## 2022-12-13 PROCEDURE — 99285 EMERGENCY DEPT VISIT HI MDM: CPT | Mod: 25

## 2022-12-13 PROCEDURE — 36415 COLL VENOUS BLD VENIPUNCTURE: CPT | Performed by: NURSE PRACTITIONER

## 2022-12-13 PROCEDURE — 85025 COMPLETE CBC W/AUTO DIFF WBC: CPT | Performed by: NURSE PRACTITIONER

## 2022-12-13 PROCEDURE — U0005 INFEC AGEN DETEC AMPLI PROBE: HCPCS

## 2022-12-13 PROCEDURE — 71101 X-RAY EXAM UNILAT RIBS/CHEST: CPT | Mod: RT

## 2022-12-13 PROCEDURE — 250N000013 HC RX MED GY IP 250 OP 250 PS 637: Performed by: EMERGENCY MEDICINE

## 2022-12-13 PROCEDURE — 74177 CT ABD & PELVIS W/CONTRAST: CPT

## 2022-12-13 PROCEDURE — 250N000013 HC RX MED GY IP 250 OP 250 PS 637

## 2022-12-13 PROCEDURE — 94150 VITAL CAPACITY TEST: CPT

## 2022-12-13 PROCEDURE — 250N000011 HC RX IP 250 OP 636: Performed by: NURSE PRACTITIONER

## 2022-12-13 PROCEDURE — 120N000001 HC R&B MED SURG/OB

## 2022-12-13 PROCEDURE — 82310 ASSAY OF CALCIUM: CPT | Performed by: NURSE PRACTITIONER

## 2022-12-13 RX ORDER — IBUPROFEN 600 MG/1
600 TABLET, FILM COATED ORAL ONCE
Status: COMPLETED | OUTPATIENT
Start: 2022-12-13 | End: 2022-12-13

## 2022-12-13 RX ORDER — OXYCODONE HYDROCHLORIDE 5 MG/1
10 TABLET ORAL EVERY 4 HOURS PRN
Status: DISCONTINUED | OUTPATIENT
Start: 2022-12-13 | End: 2022-12-16 | Stop reason: HOSPADM

## 2022-12-13 RX ORDER — IOPAMIDOL 755 MG/ML
100 INJECTION, SOLUTION INTRAVASCULAR ONCE
Status: COMPLETED | OUTPATIENT
Start: 2022-12-13 | End: 2022-12-13

## 2022-12-13 RX ORDER — OXYCODONE HYDROCHLORIDE 5 MG/1
5 TABLET ORAL EVERY 4 HOURS PRN
Status: DISCONTINUED | OUTPATIENT
Start: 2022-12-13 | End: 2022-12-16 | Stop reason: HOSPADM

## 2022-12-13 RX ORDER — NALOXONE HYDROCHLORIDE 0.4 MG/ML
0.2 INJECTION, SOLUTION INTRAMUSCULAR; INTRAVENOUS; SUBCUTANEOUS
Status: DISCONTINUED | OUTPATIENT
Start: 2022-12-13 | End: 2022-12-16 | Stop reason: HOSPADM

## 2022-12-13 RX ORDER — NALOXONE HYDROCHLORIDE 0.4 MG/ML
0.4 INJECTION, SOLUTION INTRAMUSCULAR; INTRAVENOUS; SUBCUTANEOUS
Status: DISCONTINUED | OUTPATIENT
Start: 2022-12-13 | End: 2022-12-16 | Stop reason: HOSPADM

## 2022-12-13 RX ORDER — ACETAMINOPHEN 325 MG/1
975 TABLET ORAL EVERY 8 HOURS
Status: DISCONTINUED | OUTPATIENT
Start: 2022-12-13 | End: 2022-12-16 | Stop reason: HOSPADM

## 2022-12-13 RX ORDER — LIDOCAINE 4 G/G
1 PATCH TOPICAL EVERY 24 HOURS
Status: DISCONTINUED | OUTPATIENT
Start: 2022-12-13 | End: 2022-12-16 | Stop reason: HOSPADM

## 2022-12-13 RX ORDER — HYDROCODONE BITARTRATE AND ACETAMINOPHEN 5; 325 MG/1; MG/1
1 TABLET ORAL ONCE
Status: COMPLETED | OUTPATIENT
Start: 2022-12-13 | End: 2022-12-13

## 2022-12-13 RX ADMIN — IOPAMIDOL 100 ML: 755 INJECTION, SOLUTION INTRAVENOUS at 14:47

## 2022-12-13 RX ADMIN — IBUPROFEN 600 MG: 600 TABLET, FILM COATED ORAL at 12:09

## 2022-12-13 RX ADMIN — ACETAMINOPHEN 975 MG: 325 TABLET ORAL at 19:59

## 2022-12-13 RX ADMIN — OXYCODONE HYDROCHLORIDE 10 MG: 5 TABLET ORAL at 20:05

## 2022-12-13 RX ADMIN — HYDROCODONE BITARTRATE AND ACETAMINOPHEN 1 TABLET: 5; 325 TABLET ORAL at 12:09

## 2022-12-13 RX ADMIN — LIDOCAINE PATCH 4% 1 PATCH: 40 PATCH TOPICAL at 20:00

## 2022-12-13 ASSESSMENT — ACTIVITIES OF DAILY LIVING (ADL)
ADLS_ACUITY_SCORE: 35
DOING_ERRANDS_INDEPENDENTLY_DIFFICULTY: NO
NUMBER_OF_TIMES_PATIENT_HAS_FALLEN_WITHIN_LAST_SIX_MONTHS: 1
ADLS_ACUITY_SCORE: 16
EATING: 0-->INDEPENDENT
ADLS_ACUITY_SCORE: 20
WEAR_GLASSES_OR_BLIND: NO
ADLS_ACUITY_SCORE: 16
CHANGE_IN_FUNCTIONAL_STATUS_SINCE_ONSET_OF_CURRENT_ILLNESS/INJURY: NO
ADLS_ACUITY_SCORE: 35
SWALLOWING: 0-->SWALLOWS FOODS/LIQUIDS WITHOUT DIFFICULTY (DEVELOPMENTALLY APPROPRIATE)
CONCENTRATING,_REMEMBERING_OR_MAKING_DECISIONS_DIFFICULTY: NO
EATING: 0-->INDEPENDENT
DRESSING/BATHING_DIFFICULTY: NO
WALKING_OR_CLIMBING_STAIRS_DIFFICULTY: NO
FALL_HISTORY_WITHIN_LAST_SIX_MONTHS: YES
TOILETING_ISSUES: NO
DIFFICULTY_EATING/SWALLOWING: NO
SWALLOWING: 0-->SWALLOWS FOODS/LIQUIDS WITHOUT DIFFICULTY

## 2022-12-13 ASSESSMENT — ENCOUNTER SYMPTOMS
SHORTNESS OF BREATH: 1
HEADACHES: 0
LOSS OF CONSCIOUSNESS: 0
ABDOMINAL PAIN: 0
FALLS: 1
NECK PAIN: 0

## 2022-12-13 NOTE — ED TRIAGE NOTES
Patient states he fell, does not know when, states he fell here at work. Patient states his pain is bad, pain is in right shoulder and right rib cage. Negative for thinners, denies hitting head.

## 2022-12-13 NOTE — ED NOTES
"[facility] ED Handoff Report    ED Chief Complaint: Fall, chest pain    ED Diagnosis:  (S22.49XA) Multiple rib fractures    (S42.009A) Clavicle fracture    (J94.2) Hemopneumothorax on right    (S27.329A) Pulmonary contusion    (W19.XXXA) Fall, initial encounter         PMH:    Past Medical History:   Diagnosis Date    Cellulitis, face 10/03/2012        Code Status:  No Order     Falls Risk: Yes Band: Applied    Current Living Situation/Residence: lives in a house and lives with mother     Elimination Status: Continent: Yes     Activity Level: Independent    Patients Preferred Language:  English     Needed: No    Vital Signs:  /63   Pulse 94   Temp (!) 95.5  F (35.3  C) (Temporal)   Resp 16   Ht 1.803 m (5' 11\")   Wt 113.4 kg (250 lb)   SpO2 99%   BMI 34.87 kg/m         Pain Score: 7/10    Is the Patient Confused:  No    Last Food or Drink: 12/13/22    Focused Assessment:  Alert and able to appropriately verbalize needs. Pt c/o pain in right side chest and shoulder pain. Pt has fractures on right side, (ribs and collarbone)    Tests Performed: Done: Labs and Imaging    Treatments Provided: pain management and rest    Family Dynamics/Concerns: No    Family Updated On Visitor Policy: Yes    Plan of Care Communicated to Family: Yes    Who Was Updated about Plan of Care: pt updated family    Belongings Checklist Done and Signed by Patient: Yes    Medications sent with patient: N/A    Covid: asymptomatic       RN: Maria Teresa Ross RN 12/13/2022 5:03 PM      "

## 2022-12-13 NOTE — ED NOTES
ED Triage Provider Note  [facility] EMERGENCY DEPARTMENT  Encounter Date: Dec 13, 2022    History:  Chief Complaint   Patient presents with     Fall     Christopher Euceda Jr. is a 41 year old male who presents to the ED with ports of severe right-sided chest discomfort.  Patient reports slipping and falling outside the hospital shortly prior to arrival.  Reports difficulty breathing.  Much of it in the upper right lateral chest wall.  Worsens with movement and deep respirations.  Reports feeling well prior to this.  No underlying medical issues.  Unable to provide history regarding injury    Review of Systems: Positive shortness of breath, no abdominal pain  Exam:  There were no vitals taken for this visit.  General: Marked acute distress. Appears stated age.   Cardio: Normal rate, extremities well perfused  Resp: Moderate work of breathing, grossly normal respiratory rate.  Marked right superior lateral chest wall tenderness.  Decreased range of motion right shoulder secondary to pain  Neuro: Alert. Facial movement grossly symmetric. Grossly intact strength.       Medical Decision Making:  Patient arriving to the ED with problem as above. A medical screening exam was performed.     Chest x-ray with rib details, right shoulder x-ray initiated from Triage. The patient is most appropriate to be treated in the rapid treatment area.       Coleman No MD  12/13/2022 at 11:16 AM     Coleman No MD  12/13/22 1117       Coleman No MD  12/13/22 123

## 2022-12-13 NOTE — PHARMACY-ADMISSION MEDICATION HISTORY
Pharmacy Note - Admission Medication History    Pertinent Provider Information: None     ______________________________________________________________________    Prior To Admission (PTA) med list completed and updated in EMR.       No outpatient medications have been marked as taking for the 12/13/22 encounter (Hospital Encounter).       Information source(s): Patient and CareEverywhere/SureScripts  Method of interview communication: in-person    Summary of Changes to PTA Med List  None    Patient was asked about OTC/herbal products specifically.  PTA med list reflects this.    Allergies were reviewed, assessed, and updated with the patient.      Patient does not use any multi-dose medications prior to admission.    The information provided in this note is only as accurate as the sources available at the time of the update(s).    Thank you for the opportunity to participate in the care of this patient.    Kenya Nichols RPH  12/13/2022 4:06 PM

## 2022-12-13 NOTE — ED PROVIDER NOTES
EMERGENCY DEPARTMENT ENCOUNTER      NAME: Christopher Euceda Jr.  AGE: 41 year old male  YOB: 1980  MRN: [id number]  EVALUATION DATE & TIME: No admission date for patient encounter.    PCP: No Ref-Primary, Physician    ED PROVIDER: LINDSAY Howard, CNP      Chief Complaint   Patient presents with     Fall         FINAL IMPRESSION:  1. Multiple rib fractures    2. Clavicle fracture    3. Hemopneumothorax on right    4. Pulmonary contusion    5. Fall, initial encounter          ED COURSE & MEDICAL DECISION MAKIN:32 PM I met with the patient, obtained history, performed an initial exam, and discussed options and plan for treatment here in the ED.  3:21 PM I staffed patient with Dr Lopez.  3:25 PM I updated patient with plan for care.  3:39 PM discussed case with Dr Wood from general surgery. Agrees with plan to observe. Trauma surgery will see the patient in the hospital.  3:56 PM spoke with resident from [place]. Patient accepted for admission.    Pertinent Labs & Imaging studies reviewed. (See chart for details)  41 year old male presents to the Emergency Department for evaluation of injuries after falling.  Initially noted to have right clavicle tenderness and right-sided chest wall tenderness.  Lungs were clear to auscultation.  No respiratory difficulty.  Initially slightly hypertensive.  Borderline hypoxia initially which resolved.  X-rays showed multiple rib fractures, right clavicle fracture, and possibly contusion/pneumothorax.  Follow-up imaging with CT chest abdomen pelvis was obtained.  This did show a small hemopneumothorax, rib fractures, and right clavicle fracture.  Pulmonary contusion also identified.  Case discussed with trauma surgery.  No need for chest tube at this time.  Will be placed on supplemental O2. No other apparent injuries. Pain well controlled at this time. Will be kept for observation.     At the conclusion of the encounter I discussed the results of  all of the tests and the disposition. The questions were answered. The patient or family acknowledged understanding and was agreeable with the care plan.       MEDICATIONS GIVEN IN THE EMERGENCY:  Medications   ibuprofen (ADVIL/MOTRIN) tablet 600 mg (600 mg Oral Given 12/13/22 1209)   HYDROcodone-acetaminophen (NORCO) 5-325 MG per tablet 1 tablet (1 tablet Oral Given 12/13/22 1209)   iopamidol (ISOVUE-370) solution 100 mL (100 mLs Intravenous Given 12/13/22 1447)       NEW PRESCRIPTIONS STARTED AT TODAY'S ER VISIT  New Prescriptions    No medications on file            =================================================================    HPI    Patient information was obtained from: Patient    Use of Intrepreter: N/A        Christopher Euceda Jr. is a 41 year old male with a history of methamphetamine abuse who presents via walk-in for evaluation of right shoulder and rib pain.    Patient states that he was looking for an ice fishing spot today when he slipped and fell on his auger, saying he got the wind knocked out of him. No head trauma, no LOC. Patient currently endorses right shoulder and rib pain, as well as shortness of breath secondary to pain. He endorses tobacco use. Patient also notes that he used to be a meth addict but has been clean for 6 years.    Patient denies all other relevant symptoms.      REVIEW OF SYSTEMS   Review of Systems   Respiratory: Positive for shortness of breath (Secondary to pain).    Gastrointestinal: Negative for abdominal pain.   Musculoskeletal: Positive for falls and joint pain (Right shoulder). Negative for neck pain.        Positive for right rib pain.   Neurological: Negative for loss of consciousness and headaches.   All other systems reviewed and are negative.      PAST MEDICAL HISTORY:  Past Medical History:   Diagnosis Date     Cellulitis, face 10/03/2012       PAST SURGICAL HISTORY:  No past surgical history on file.        CURRENT MEDICATIONS:    No current  "facility-administered medications for this encounter.     No current outpatient medications on file.         ALLERGIES:  Allergies   Allergen Reactions     No Known Allergies        FAMILY HISTORY:  Family History   Problem Relation Age of Onset     Gastrointestinal Disease Father         crohns     Cardiovascular Father         MI       SOCIAL HISTORY:   Social History     Socioeconomic History     Marital status: Single   Tobacco Use     Smoking status: Every Day     Packs/day: 0.75     Types: Cigarettes     Smokeless tobacco: Former   Substance and Sexual Activity     Alcohol use: No     Comment: sober since 01/2018     Drug use: No     Sexual activity: Yes     Partners: Female         VITALS:  Patient Vitals for the past 24 hrs:   BP Temp Temp src Pulse Resp SpO2 Height Weight   12/13/22 1430 117/63 -- -- 94 16 99 % -- --   12/13/22 1345 132/62 -- -- 92 -- 93 % -- --   12/13/22 1327 132/67 -- -- 94 -- 93 % -- --   12/13/22 1115 (!) 140/87 (!) 95.5  F (35.3  C) Temporal 81 22 91 % 1.803 m (5' 11\") 113.4 kg (250 lb)       PHYSICAL EXAM    Constitutional: Alert, no distress.  GCS 15  EYES: Conjunctivae clear  HENT:  Atraumatic, normocephalic  Respiratory:  No respiratory distress, normal breath sounds  Cardiovascular:  Normal rate, normal rhythm, no murmurs  GI:  Soft, nondistended, nontender, no palpable masses, no rebound, no guarding   Musculoskeletal: No midline cervical spine tenderness.  Tenderness middle and distal right clavicle.  Chest wall tenderness on the right in the midaxillary line.  No crepitus.  Integument: Warm, Dry  Neurologic:  Alert & oriented x 3              LAB:  All pertinent labs reviewed and interpreted.  Labs Ordered and Resulted from Time of ED Arrival to Time of ED Departure   BASIC METABOLIC PANEL - Abnormal       Result Value    Sodium 139      Potassium 3.8      Chloride 103      Carbon Dioxide (CO2) 25      Anion Gap 11      Urea Nitrogen 12.8      Creatinine 0.97      Calcium 9.3  "     Glucose 124 (*)     GFR Estimate >90           RADIOLOGY:  Reviewed all pertinent imaging. Please see official radiology report.  CT Chest/Abdomen/Pelvis w Contrast   Final Result   IMPRESSION:      1.  Small right hemopneumothorax.   2.  Most of the right lower lobe opacities likely reflect incomplete inflation/atelectasis. Areas of opacity in the lateral right lower lobe and in the limited subpleural distribution posterolateral right upper lobe likely relate to lung contusion.   3.  Acute comminuted fracture of the middle and lateral thirds of the right clavicle and minimally displaced fractures of the right lateral fifth through seventh ribs.      Ribs XR, unilat 3 views + PA chest, right   Final Result   IMPRESSION: Contiguous mildly displaced fractures of the right third, fourth, fifth, sixth, and seventh ribs posterolaterally, which raises the possibility of flail chest. There are some patchy adjacent airspace opacities and suggestion of right apical    hyperlucency, which could represent hemorrhage and associated pneumothorax. Recommend chest CT to assess for pneumothorax and additional fractures.            XR Shoulder Right 2 Views   Final Result   IMPRESSION: Acute mildly comminuted and displaced fracture of the mid to distal clavicular shaft. The main distal fragment is displaced inferiorly by approximately 3 mm. There is a separate displaced cortical bone fragment adjacent and superior to the    fracture. No dislocation. Underlying mild degenerative arthrosis of the AC joint.      Multiple acute displaced right rib fractures.                PROCEDURES:   None      ARTUR, Victor Manuel Purcell, am serving as a scribe to document services personally performed by Gurpreet Mares CNP. based on my observation and the provider's statements to me. IGurpreet CNP attest that Victor Manuel Purcell is acting in a scribe capacity, has observed my performance of the services and has documented them in accordance with my  direction.    LINDSAY Howard, CNP  Emergency Medicine  [facility] EMERGENCY DEPARTMENT  [facility] [facility] [id number] [phone]  Dept: [phone]          Gurpreet Mares APRN CNP  12/13/22 1552

## 2022-12-13 NOTE — ED PROVIDER NOTES
Emergency Department Midlevel Supervisory Note     I personally saw the patient and performed a substantive portion of the visit including all aspects of the medical decision making.    ED Course:  3:22 PM Gurpreet Mares CNP staffed patient with me. I agree with their assessment and plan of management, and I will see the patient.  3:35 PM I met with the patient to introduce myself, gather additional history, perform my initial exam, and discuss the plan.     Brief HPI:     Christopher Euceda Jr. is a 41 year old male with no pertinent medical history who presents for evaluation of right shoulder and right sided rib pain with associated dyspnea secondary to slipping and falling on his auger while ice fishing this afternoon. Denies head injury or loss of consciousness and he is not currently anticoagulated. Otherwise denies any other symptoms or concerns at this time.      I, Rosalba Juarez, am serving as a scribe to document services personally performed by Malachi Lopez MD, based on my observations and the provider's statements to me.   I, Malachi Lopez MD, attest that Rosalba Juarez was acting in a scribe capacity, has observed my performance of the services and has documented them in accordance with my direction.     Brief Physical Exam:  Constitutional:  Alert, in no acute distress  EYES: Conjunctivae clear  HENT:  Atraumatic, normocephalic  Respiratory:  Respirations even, unlabored, in no acute respiratory distress  Cardiovascular:  Regular rate and rhythm, good peripheral perfusion  GI: Soft, nondistended, nontender, no palpable masses, no rebound, no guarding   Musculoskeletal:   There is swelling and deformity to the right clavicle, there is lateral rib tenderness on the right side.  Integument: Warm, Dry, No erythema, No rash.   Neurologic:  Alert & oriented, no focal deficits noted  Psych: Normal mood and affect     MDM:  Patient is a 41-year-old male who presents to the emergency department with  right-sided chest pain and clavicle pain after falling on the ice onto his fishing auger.  He was found to have 3 rib fractures with an associated small pneumothorax/hemothorax and pulmonary contusion as well as a complex clavicle fracture.  He is stable on room air, I think his pneumothorax can be monitored without intervention at this point.  Pain was well controlled by the time I was meeting him here.  We will place him in a sling for his clavicle fracture.  He will be admitted to medicine for pain control and monitoring his pneumothorax with the trauma surgery consult.  Agree with remainder of ED course and plan as documented by LINDA.          1. Multiple rib fractures    2. Clavicle fracture    3. Hemopneumothorax on right    4. Pulmonary contusion    5. Fall, initial encounter        Labs and Imaging:  Results for orders placed or performed during the hospital encounter of 12/13/22   Ribs XR, unilat 3 views + PA chest, right    Impression    IMPRESSION: Contiguous mildly displaced fractures of the right third, fourth, fifth, sixth, and seventh ribs posterolaterally, which raises the possibility of flail chest. There are some patchy adjacent airspace opacities and suggestion of right apical   hyperlucency, which could represent hemorrhage and associated pneumothorax. Recommend chest CT to assess for pneumothorax and additional fractures.       XR Shoulder Right 2 Views    Impression    IMPRESSION: Acute mildly comminuted and displaced fracture of the mid to distal clavicular shaft. The main distal fragment is displaced inferiorly by approximately 3 mm. There is a separate displaced cortical bone fragment adjacent and superior to the   fracture. No dislocation. Underlying mild degenerative arthrosis of the AC joint.    Multiple acute displaced right rib fractures.   CT Chest/Abdomen/Pelvis w Contrast    Impression    IMPRESSION:    1.  Small right hemopneumothorax.  2.  Most of the right lower lobe opacities  likely reflect incomplete inflation/atelectasis. Areas of opacity in the lateral right lower lobe and in the limited subpleural distribution posterolateral right upper lobe likely relate to lung contusion.  3.  Acute comminuted fracture of the middle and lateral thirds of the right clavicle and minimally displaced fractures of the right lateral fifth through seventh ribs.   Basic metabolic panel   Result Value Ref Range    Sodium 139 136 - 145 mmol/L    Potassium 3.8 3.4 - 5.3 mmol/L    Chloride 103 98 - 107 mmol/L    Carbon Dioxide (CO2) 25 22 - 29 mmol/L    Anion Gap 11 7 - 15 mmol/L    Urea Nitrogen 12.8 6.0 - 20.0 mg/dL    Creatinine 0.97 0.67 - 1.17 mg/dL    Calcium 9.3 8.6 - 10.0 mg/dL    Glucose 124 (H) 70 - 99 mg/dL    GFR Estimate >90 >60 mL/min/1.73m2   Extra Blue Top Tube   Result Value Ref Range    Hold Specimen JIC    Extra Red Top Tube   Result Value Ref Range    Hold Specimen JIC    Extra Green Top (Lithium Heparin) Tube   Result Value Ref Range    Hold Specimen JIC    Extra Purple Top Tube   Result Value Ref Range    Hold Specimen JIC      I have reviewed the relevant laboratory and radiology studies        Malachi Lopez MD  [facility] EMERGENCY DEPARTMENT  [facility] [facility] [id number] [phone]      Malachi Lopez MD  12/13/22 7719

## 2022-12-14 ENCOUNTER — APPOINTMENT (OUTPATIENT)
Dept: RADIOLOGY | Facility: HOSPITAL | Age: 42
DRG: 199 | End: 2022-12-14
Attending: NURSE PRACTITIONER

## 2022-12-14 ENCOUNTER — APPOINTMENT (OUTPATIENT)
Dept: PHYSICAL THERAPY | Facility: HOSPITAL | Age: 42
DRG: 199 | End: 2022-12-14

## 2022-12-14 LAB
ANION GAP SERPL CALCULATED.3IONS-SCNC: 10 MMOL/L (ref 7–15)
BUN SERPL-MCNC: 11 MG/DL (ref 6–20)
CALCIUM SERPL-MCNC: 8.9 MG/DL (ref 8.6–10)
CHLORIDE SERPL-SCNC: 101 MMOL/L (ref 98–107)
CREAT SERPL-MCNC: 0.8 MG/DL (ref 0.67–1.17)
DEPRECATED HCO3 PLAS-SCNC: 24 MMOL/L (ref 22–29)
ERYTHROCYTE [DISTWIDTH] IN BLOOD BY AUTOMATED COUNT: 12.5 % (ref 10–15)
GFR SERPL CREATININE-BSD FRML MDRD: >90 ML/MIN/1.73M2
GLUCOSE SERPL-MCNC: 97 MG/DL (ref 70–99)
HCT VFR BLD AUTO: 42.2 % (ref 40–53)
HGB BLD-MCNC: 14.1 G/DL (ref 13.3–17.7)
HOLD SPECIMEN: NORMAL
MAGNESIUM SERPL-MCNC: 1.8 MG/DL (ref 1.7–2.3)
MCH RBC QN AUTO: 30.8 PG (ref 26.5–33)
MCHC RBC AUTO-ENTMCNC: 33.4 G/DL (ref 31.5–36.5)
MCV RBC AUTO: 92 FL (ref 78–100)
PHOSPHATE SERPL-MCNC: 3.3 MG/DL (ref 2.5–4.5)
PLATELET # BLD AUTO: 131 10E3/UL (ref 150–450)
POTASSIUM SERPL-SCNC: 4.1 MMOL/L (ref 3.4–5.3)
RBC # BLD AUTO: 4.58 10E6/UL (ref 4.4–5.9)
SODIUM SERPL-SCNC: 135 MMOL/L (ref 136–145)
WBC # BLD AUTO: 11.7 10E3/UL (ref 4–11)

## 2022-12-14 PROCEDURE — 85027 COMPLETE CBC AUTOMATED: CPT

## 2022-12-14 PROCEDURE — 71045 X-RAY EXAM CHEST 1 VIEW: CPT

## 2022-12-14 PROCEDURE — 97162 PT EVAL MOD COMPLEX 30 MIN: CPT | Mod: GP

## 2022-12-14 PROCEDURE — 83735 ASSAY OF MAGNESIUM: CPT

## 2022-12-14 PROCEDURE — 97530 THERAPEUTIC ACTIVITIES: CPT | Mod: GP

## 2022-12-14 PROCEDURE — 99223 1ST HOSP IP/OBS HIGH 75: CPT | Mod: AI

## 2022-12-14 PROCEDURE — 99221 1ST HOSP IP/OBS SF/LOW 40: CPT | Performed by: NURSE PRACTITIONER

## 2022-12-14 PROCEDURE — 80048 BASIC METABOLIC PNL TOTAL CA: CPT

## 2022-12-14 PROCEDURE — 999N000157 HC STATISTIC RCP TIME EA 10 MIN

## 2022-12-14 PROCEDURE — 120N000001 HC R&B MED SURG/OB

## 2022-12-14 PROCEDURE — 94150 VITAL CAPACITY TEST: CPT

## 2022-12-14 PROCEDURE — 99407 BEHAV CHNG SMOKING > 10 MIN: CPT

## 2022-12-14 PROCEDURE — 250N000013 HC RX MED GY IP 250 OP 250 PS 637

## 2022-12-14 PROCEDURE — 84100 ASSAY OF PHOSPHORUS: CPT

## 2022-12-14 PROCEDURE — 999N000156 HC STATISTIC RCP CONSULT EA 30 MIN

## 2022-12-14 PROCEDURE — 999N000032 HC STATISTIC CHRONIC DISEASE SPECIALIST RT CONSULT

## 2022-12-14 PROCEDURE — 36415 COLL VENOUS BLD VENIPUNCTURE: CPT

## 2022-12-14 RX ORDER — AMOXICILLIN 250 MG
1 CAPSULE ORAL 2 TIMES DAILY
Status: DISCONTINUED | OUTPATIENT
Start: 2022-12-14 | End: 2022-12-16 | Stop reason: HOSPADM

## 2022-12-14 RX ADMIN — OXYCODONE HYDROCHLORIDE 10 MG: 5 TABLET ORAL at 16:29

## 2022-12-14 RX ADMIN — SENNOSIDES AND DOCUSATE SODIUM 1 TABLET: 8.6; 5 TABLET ORAL at 20:50

## 2022-12-14 RX ADMIN — ACETAMINOPHEN 975 MG: 325 TABLET ORAL at 12:34

## 2022-12-14 RX ADMIN — ACETAMINOPHEN 975 MG: 325 TABLET ORAL at 04:17

## 2022-12-14 RX ADMIN — OXYCODONE HYDROCHLORIDE 10 MG: 5 TABLET ORAL at 04:16

## 2022-12-14 RX ADMIN — OXYCODONE HYDROCHLORIDE 10 MG: 5 TABLET ORAL at 00:19

## 2022-12-14 RX ADMIN — OXYCODONE HYDROCHLORIDE 10 MG: 5 TABLET ORAL at 12:33

## 2022-12-14 RX ADMIN — ACETAMINOPHEN 975 MG: 325 TABLET ORAL at 20:50

## 2022-12-14 RX ADMIN — LIDOCAINE PATCH 4% 1 PATCH: 40 PATCH TOPICAL at 20:50

## 2022-12-14 RX ADMIN — OXYCODONE HYDROCHLORIDE 10 MG: 5 TABLET ORAL at 23:40

## 2022-12-14 RX ADMIN — OXYCODONE HYDROCHLORIDE 10 MG: 5 TABLET ORAL at 08:27

## 2022-12-14 ASSESSMENT — ACTIVITIES OF DAILY LIVING (ADL)
ADLS_ACUITY_SCORE: 19
ADLS_ACUITY_SCORE: 21
ADLS_ACUITY_SCORE: 19
ADLS_ACUITY_SCORE: 21
ADLS_ACUITY_SCORE: 21
ADLS_ACUITY_SCORE: 19
ADLS_ACUITY_SCORE: 19
ADLS_ACUITY_SCORE: 20
ADLS_ACUITY_SCORE: 19
ADLS_ACUITY_SCORE: 19
ADLS_ACUITY_SCORE: 20
ADLS_ACUITY_SCORE: 20

## 2022-12-14 NOTE — PROGRESS NOTES
12/14/22 1000   Appointment Info   Signing Clinician's Name / Credentials (PT) Gala Sanders PT   Living Environment   People in Home parent(s)  (mother)   Current Living Arrangements mobile home   Home Accessibility stairs to enter home   Number of Stairs, Main Entrance 4   Stair Railings, Main Entrance railings safe and in good condition;railings on both sides of stairs   Transportation Anticipated family or friend will provide   Living Environment Comments stays with mother or his [place] cabin   Self-Care   Usual Activity Tolerance excellent   Current Activity Tolerance poor   Regular Exercise No   Equipment Currently Used at Home none   Fall history within last six months yes   Number of times patient has fallen within last six months 1   Activity/Exercise/Self-Care Comment Independent with mobility and ADL ,works full time in construction   General Information   Onset of Illness/Injury or Date of Surgery 12/13/22   Referring Physician John Melton   Patient/Family Therapy Goals Statement (PT) to have less pain   Pertinent History of Current Problem (include personal factors and/or comorbidities that impact the POC) admitted after falling on ice while ice fishing sustaining R clavicle fx and rib fx 3-7-th,small pneumothorax -NPO for poss CT or draining   Existing Precautions/Restrictions other (see comments);fall;shoulder;oxygen therapy device and L/min  (1 l.sling R arm)   Weight-Bearing Status - RUE nonweight-bearing   General Observations In bed ,up in chair after PT   Cognition   Affect/Mental Status (Cognition) WFL   Pain Assessment   Patient Currently in Pain Yes, see Vital Sign flowsheet   Range of Motion (ROM)   Range of Motion ROM is WFL   ROM Comment zully L/E   Strength (Manual Muscle Testing)   Strength (Manual Muscle Testing) strength is WFL   Strength Comments zully L/E   Bed Mobility   Bed Mobility Limitations decreased ability to use arms for pushing/pulling   Impairments Contributing to  Impaired Bed Mobility pain;other (see comments)  (R arm immobilized in sling-donned sling in bed)   Assistive Device (Bed Mobility) bed rails   Comment, (Bed Mobility) multiple attempts and needed extra time   Transfers   Transfers sit-stand transfer   Maintains Weight-bearing Status (Transfers) able to maintain   Impairments Contributing to Impaired Transfers pain   Sit-Stand Transfer   Sit-Stand Larsen (Transfers) contact guard   Assistive Device (Sit-Stand Transfers) other (see comments)  (none)   Gait/Stairs (Locomotion)   [place]arsen Level (Gait) contact guard   Assistive Device (Gait) other (see comments)  (none)   Distance in Feet 6 feet   Pattern (Gait) swing-through   Deviations/Abnormal Patterns (Gait) base of support, narrow;gait speed decreased;stride length decreased;weight shifting decreased   Maintains Weight-bearing Status (Gait) able to maintain   Clinical Impression   Criteria for Skilled Therapeutic Intervention Yes, treatment indicated   PT Diagnosis (PT) impaired functional mobility   Influenced by the following impairments pain   Functional limitations due to impairments bed mobility ,gait   Clinical Presentation (PT Evaluation Complexity) Evolving/Changing   Clinical Presentation Rationale pt presents as med diagnosed   Clinical Decision Making (Complexity) moderate complexity   Planned Therapy Interventions (PT) bed mobility training;gait training;stair training   Anticipated Equipment Needs at Discharge (PT) other (see comments)  (none)   Risk & Benefits of therapy have been explained evaluation/treatment results reviewed;patient   Clinical Impression Comments Patient is a 40 yo male admitted with clavicle fx ,ribs ff and pneumothorax,Presents with mobility deficits due to pain .Appropriate for skilled PT to increase independence to return home.   PT Total Evaluation Time   PT Eval, Moderate Complexity Minutes (28104) 10   Physical Therapy Goals   PT Frequency Daily   PT Predicted  Duration/Target Date for Goal Attainment 12/21/22   PT Goals Bed Mobility;Transfers;Gait;Stairs   PT: Bed Mobility Supine to/from sit;Minimal assist   PT: Transfers Independent   PT: Gait Supervision/stand-by assist;Greater than 200 feet   PT: Stairs Modified independent;4 stairs;Rail on left   Therapeutic Activity   Therapeutic Activities: dynamic activities to improve functional performance Minutes (59813) 8   Symptoms Noted During/After Treatment Increased pain   Treatment Detail/Skilled Intervention supine -sit with max assist HOB elevated and bed rail on L with sling on R arm.AMB 12 feet along side of bed with no device ,CGA -O2 on 2 l at 92-93 %   PT Discharge Planning   PT Plan bed mobility ,distance amb -bring portable O 2 tank   PT Discharge Recommendation (DC Rec) home with assist   PT Rationale for DC Rec patient is young ,stays with mother who can assist as needed   PT Brief overview of current status Mox assist to get OOB ,sit -stand CGA .Amb 12 feet with CGA.   Total Session Time   Timed Code Treatment Minutes 8   Total Session Time (sum of timed and untimed services) 18

## 2022-12-14 NOTE — PROGRESS NOTES
[facility]    Progress Note - Hospitalist Service       Date of Admission:  12/13/2022    Assessment & Plan   methamphetamine abuse and presented with R shoulder and chest pain and SOB 2/2 to falling on ice auger while ice fishing. Admitted for acute R 3rd-7th rib fractures, clavicular fracture, small R hemopneumothorax, and pulmonary contusion.     Right acute 3rd-7th rib fractures  Presented with pain and SOB following chest wall trauma.   XR chest significant for contiguous mildly displaced fractures of R 3rd through 7th ribs posterolaterally. Associated hemopneumothorax as below. Pain fairly well controlled.  Hemodynamically stable.  We will have trauma surgery come by to evaluate.  - trauma surgery consulted, appreciate recs  - scheduled tylenol, prn oxycodone, lidocaine patch, prn cold pack for pain control  - IS, flutter valve  - continuous pulse ox, supp O2 as needed  - daily BMP, Mg, Phos  - PT/OT/CM consulted, appreciate recs       Right small hemopneumothorax  Acute hypoxic respiratory failure.   CT chest/abdomen significant for small R hemopneumothorax and opacities consistent with incomplete inflation/atelectasis and contusion. Was placed on supplemental O2 overnight, currently on 2L nasal cannula.  - Repeat chest Xray to ensure stability  - Incentive spirometry       Clavicle fracture  XR significant for acute mildly comminuted and displaced fracture of [id number] clavicular shaft, with main distal fragment inferior by ~3mm, and separate displaced cortical bone fragment superiorly adjacent. No neurovascular compromise.  - Orthopedic surgery consult   - Pain management as above  - Arm sling ordered    Leukocytosis   WBC 18.1 on admission, suspect reactive. No signs/syptoms of infection  - Recheck today    Tobacco use   Tobacco treatment referral placed. Would like to quit cold turkey. Will continue to offer nicotine replacement therapy.          Diet: NPO per Anesthesia  "Guidelines for Procedure/Surgery Except for: Meds    DVT Prophylaxis: Pneumatic Compression Devices  Matthews Catheter: Not present  Fluids: Oral   Central Lines: None  Cardiac Monitoring: None  Code Status: Full Code        Disposition Plan      Expected Discharge Date: 12/15/2022    Discharge Delays: Specialist Consult (enter specialist & decision needed in comments)  Oxygen Needs - Arrange Home O2            The patient's care was discussed with Dr Evelyne Pringle MD  Hospitalist [facility]  Securely message with the Vocera Web Console (learn more here)  Text page via [employer] mobile Paging/Directory         Clinically Significant Risk Factors Present on Admission         # Hyponatremia: Lowest Na = 135 mmol/L in last 2 days, will monitor as appropriate               # Obesity: Estimated body mass index is 34.87 kg/m  as calculated from the following:    Height as of this encounter: 1.803 m (5' 11\").    Weight as of this encounter: 113.4 kg (250 lb).           ______________________________________________________________________    Interval History   Currently feels well.  Denies any shortness of breath.  No hemoptysis.  Continues to have pain with inspiration, has been doing well with oral medications.  Denies any headaches or vision changes.  No new extremity weakness.  No bowel or bladder dysfunction.    Data reviewed today: I reviewed all medications, new labs and imaging results over the last 24 hours.    Physical Exam   Vital Signs: Temp: 98.6  F (37  C) Temp src: Oral BP: 124/65 Pulse: 82   Resp: 20 SpO2: 92 % O2 Device: Nasal cannula Oxygen Delivery: 2 LPM  Weight: 250 lbs 0 oz  General Appearance: Comfortable.  No acute distress.  Respiratory: Comfortable respiratory effort.  No retractions.  Moving air bilaterally.  Cardiovascular: Regular rate rhythm.  No murmurs rubs or gallops.  GI: Nondistended.  Soft nontender.  Skin: Warm well perfused.  Chest wall: " Significant tenderness at the right anterior chest wall.    Data   Recent Labs   Lab 12/14/22  0458 12/13/22  1238   WBC  --  18.1*   HGB  --  15.5   MCV  --  92   PLT  --  292   * 139   POTASSIUM 4.1 3.8   CHLORIDE 101 103   CO2 24 25   BUN 11.0 12.8   CR 0.80 0.97   ANIONGAP 10 11   GLORY 8.9 9.3   GLC 97 124*

## 2022-12-14 NOTE — CONSULTS
"TRAUMA SURGERY EVALUATION    TIME OF EVALUATION: 1212    HPI  41 year old year old male who presented to the ED after a fall onto an ice auger while ice fishing. Patient reports instant pain with fall and \"the wind knocked out of me\". He reports that the pain continues to be intense with movement and deep breaths. Patient reports no hitting head with fall, loss of consciousness around the event, pain in extremities or abdomen, headache, blurred vision, nausea, vomiting, or urinary difficulties/changes. He has some SOB which he states is due to pain and not inability to take a deep breath. He is hitting 1500 on the IS.     CT showed fractures to the right clavicle and the right 6-7 lateral ribs as well as a small hemopneumothorax. Repeat chest xray today shows no significant hemothorax or pneumothorax.    CC/Mechanism of Injury:Mechanical fall onto ice auger      Allergies:Patient has no known allergies.    Past Medical History:   Diagnosis Date     Cellulitis, face 10/03/2012       No past surgical history on file.    CURRENT MEDS:    Current Facility-Administered Medications:      acetaminophen (TYLENOL) tablet 975 mg, 975 mg, Oral, Q8H, Ashvin Henning MD, 975 mg at 12/14/22 1234     Lidocaine (LIDOCARE) 4 % Patch 1 patch, 1 patch, Transdermal, Q24H, Ashvin Henning MD, 1 patch at 12/13/22 2000     lidocaine patch in PLACE, , Transdermal, Q8H [facility], Ashvin Henning MD     naloxone (NARCAN) injection 0.2 mg, 0.2 mg, Intravenous, Q2 Min PRN **OR** naloxone (NARCAN) injection 0.4 mg, 0.4 mg, Intravenous, Q2 Min PRN **OR** naloxone (NARCAN) injection 0.2 mg, 0.2 mg, Intramuscular, Q2 Min PRN **OR** naloxone (NARCAN) injection 0.4 mg, 0.4 mg, Intramuscular, Q2 Min PRN, John Stubbs MD     oxyCODONE (ROXICODONE) tablet 5 mg, 5 mg, Oral, Q4H PRN **OR** oxyCODONE (ROXICODONE) tablet 10 mg, 10 mg, Oral, Q4H PRN, Ashvin Henning MD, 10 mg at 12/14/22 1233    Family History   Problem Relation Age of Onset     " "Gastrointestinal Disease Father         crohns     Cardiovascular Father         MI        reports that he has been smoking. He has been smoking an average of .75 packs per day. He has quit using smokeless tobacco. He reports that he does not drink alcohol and does not use drugs.    Review of Systems:  The 12 point review of systems  is within normal limits except for as mentioned above in the HPI.    EXAM:  /65 (BP Location: Left arm)   Pulse 82   Temp 98.6  F (37  C) (Oral)   Resp 20   Ht 1.803 m (5' 11\")   Wt 113.4 kg (250 lb)   SpO2 92%   BMI 34.87 kg/m    GCS:15  GEN:No Acute distress, conversant, pleasant  HEENT:Normocephalic, no obvious signs of trauma, EOMI, PERRLA  RESP:CTA bilaterally, no wheezes, no rales  CHEST: Tender over right clavicle and mid anteriolateral rib cage with edema noted, stable in AP and lateral compression; no eccymosis or crepitus  CV:RRR, no murmurs, gallops or rubs  ABD:Soft, non tender, no obvious signs of trauma  PELVIS:Stable to AP and Lateral compression, no obvious signs of trauma  :No obvious trauma or abnormalities  EXT: Bilateral upper and lower extremities with no obvious trauma or deformities  C/T/L SPINE:Non tender, no step offs or obvious signs of trauma          LABS:  Lab Results   Component Value Date    WBC 18.1 12/13/2022    WBC 10.2 02/14/2018    HGB 15.5 12/13/2022    HGB 14.1 02/14/2018    HCT 45.0 12/13/2022    HCT 43.6 02/14/2018    MCV 92 12/13/2022    MCV 93 02/14/2018     12/13/2022     02/14/2018     INR/Prothrombin Time  Recent Labs   Lab 12/14/22  0458   *   CO2 24   BUN 11.0     Lab Results   Component Value Date    ALT 33 06/17/2011    AST 29 06/17/2011    ALKPHOS 100 06/17/2011       IMAGES:   Relevant images were reviewed and discussed with the patient.      Assessment/Plan:   Christopher Euceda Jr. is a 41 year old male with right clavical and rib fractures. Hemopnemothorax looks to be resolving and was too small " initially for intervention. No chest tube warranted    Diet as tolerated; defer to medicine  No surgical intervention warranted  Pain control  Good pulmonary hygeine  Surgery will sign off      Migonn Loza, CNP  [phone]  [facility] General and Bariatric Surgery

## 2022-12-14 NOTE — CONSULTS
ORTHOPEDIC CONSULTATION    Consultation  Christopher Euceda Jr.,  1980, MRN [id number]    Clavicle fracture [S42.009A]  Pulmonary contusion [S27.329A]  Hemopneumothorax on right [J94.2]  Multiple rib fractures [S22.49XA]  Fall, initial encounter [W19.XXXA]    PCP: No Ref-Primary, Physician, None   Code status:  Full Code       Extended Emergency Contact Information  Primary Emergency Contact: HUNTER EUCEDA   [facility]  Home Phone: [phone]  Relation: Mother  Other needs: None  Preferred language: English   needed? No  Secondary Emergency Contact: Christopher Euceda   Address: [address] [place] [address] [id number] [place] [address]  Home Phone: [phone]  Relation: Father  Hearing or visual needs: None  Other needs: None  Preferred language: English   needed? No         IMPRESSION:  Closed mildly comminuted and displaced fracture of the mid to distal clavicular shaft of the Right clavicle.      PLAN:  This patient was discussed with Dr. Cordero and Melissa Lisa PA-C on-call team for [facility] and they are in agreement with the following plan.  Patient was evaluated for right clavicular fracture after mechanical fall.  On exam RUE neurovascular intact and sling placed.  X-ray revealed mildly comminuted and displaced fracture of the mid to distal clavicular shaft.  Recommendation follow:    -Plan for outpatient follow-up with Dr. Cordero on 2022 at 9:30 AM to repeat XR and further discuss treatment option.   -Non op treatment for now.  -RUE NWB, sling on at all times.  Okay for wrist and elbow ROM.  -Pain management per medicine  -Ortho will sign off. Please feel free to reach out with any further questions or concerns.     Thank you for including [facility] in the care of Christopher Euceda . It has been a pleasure participating in Christopher MARLOW Ok Modi's care.        CHIEF COMPLAINT: Fall, initial encounter    HISTORY OF  PRESENT ILLNESS:  The patient is seen in orthopedic consultation at the request of Tom Ellison.  The patient is a 41 year old male right-hand-dominant with mild and moderate pain of the right  shoulder and clavicle. The patient reports that while ice fishing yesterday, he slipped landing on his right side. Patient reports bystander nearby helped and called for assistance. Patient noted right upper extremity pain and shortness of breath. They report that their pain does not radiate. The patient reports that their pain is alleviated by rest and is exacerbated by movement.  Patient lives with mother at [place], [place].  Denies new numbness, tingling.      ALLERGIES:   Review of patient's allergies indicates No Known Allergies      MEDICATIONS UPON ADMISSION:  Medications were reviewed.  They include:   No medications prior to admission.         SOCIAL HISTORY:   he  reports that he has been smoking. He has been smoking an average of .75 packs per day. He has quit using smokeless tobacco. He reports that he does not drink alcohol and does not use drugs.      FAMILY HISTORY:  family history includes Cardiovascular in his father; Gastrointestinal Disease in his father.      REVIEW OF SYSTEMS:   Reviewed with patient. See HPI, otherwise negative       PHYSICAL EXAMINATION:  Vitals: Temp:  [97.9  F (36.6  C)-98.7  F (37.1  C)] 98.6  F (37  C)  Pulse:  [] 82  Resp:  [16-20] 20  BP: (117-137)/(62-81) 124/65  SpO2:  [89 %-99 %] 92 %  General: On examination, the patient is awake and alert and oriented to general circumstances.  Patient sitting up in bed and appears comfortable.  SKIN: There is no evidence of erythema, warmth, ecchymosis, swelling, deformity, break to the skin, open wound.  Pulses:  radial pulse is intact and equal bilaterally  Sensation: intact and equal bilaterally to the distal upper extremities.  AIN, median, radial motor nerve intact.  Tenderness: NTTP of the proximal clavicle,  sternoclavicular, distal humerus, elbow, ulna, radius, wrist and hand.  Defer palpation of the distal clavicle given known fracture.  ROM: Wiggle finger without pain. Wrist flexion/extension without pain.  elbow flexion/extension without pain.  Deferred shoulder ROM given known fracture.  Motor: hand  intact.  Contralateral side= Full range of motion, Negative joint instability findings, 5/5 motor groups about the joint, Non-tender.       RADIOGRAPHIC EVALUATION:  Personally reviewed  Narrative & Impression   EXAM: XR SHOULDER RIGHT 2 VIEWS  LOCATION: [facility]  DATE/TIME: 12/13/2022 12:07 PM     INDICATION: Injury, pain  COMPARISON: None.                                                                      IMPRESSION: Acute mildly comminuted and displaced fracture of the mid to distal clavicular shaft. The main distal fragment is displaced inferiorly by approximately 3 mm. There is a separate displaced cortical bone fragment adjacent and superior to the   fracture. No dislocation. Underlying mild degenerative arthrosis of the AC joint.     Multiple acute displaced right rib fractures.         PERTINENT LABS:  Lab Results: personally reviewed.   Lab Results   Component Value Date     12/14/2022     02/14/2018    CO2 24 12/14/2022    CO2 25 02/14/2018    BUN 11.0 12/14/2022    BUN 11 02/14/2018     Lab Results   Component Value Date    WBC 18.1 12/13/2022    WBC 10.2 02/14/2018    HGB 15.5 12/13/2022    HGB 14.1 02/14/2018    HCT 45.0 12/13/2022    HCT 43.6 02/14/2018    MCV 92 12/13/2022    MCV 93 02/14/2018     12/13/2022     02/14/2018         Don Wood PA-C, HARJIT  Date: 12/14/2022  Time: 12:58 PM    CC1:   John Stubbs MD    CC2:   No Ref-Primary, Physician

## 2022-12-14 NOTE — H&P
[facility]    History and Physical - Hospitalist Service       Date of Admission:  12/13/2022    Assessment & Plan      Christopher Euceda Jr. is a 41 year old male admitted on 12/13/2022. He has a history of methamphetamine abuse and presented with R shoulder and chest pain and SOB 2/2 to falling on ice auger while ice fishing. Presented mildly hypothermic and hypertensive. WBC 18.1 but labs otherwise unremarkable. Imaging significant for acute R 3rd-7th rib fractures, clavicular fracture, small R hemopneumothorax, and pulmonary contusion. Admitted for pain management and consultation with trauma surgery.     R 3rd-7th rib fractures  Clavicle fracture  R small hemopneumothorax  Pulmonary contusion  Shortness of breath  Patient reports being out ice fishing when he fell on his ice auger, getting wind knocked out of him, experiencing immediate R shoulder and chest pain in addition to SOB. No head trauma, LOC, N/V, HA, blurry vision, or abd pain. Initially presented mildly hypothermic and hypertensive, but vitals have since normalized and continues on RA. WBC 18.1 but labs otherwise unremarkable. Shoulder XR significant for acute mildly comminuted and displaced fracture of [id number] clavicular shaft, with main distal fragment inferior by ~3mm, and separate displaced cortical bone fragment superiorly adjacent. XR chest significant for contiguous mildly displaced fractures of R 3rd through 7th ribs posterolaterally. CT chest/abdomen significant for small R hemopneumothorax and opacities consistent with incomplete inflation/atelectasis and contusion. ED reached out to trauma surgery, who will see patient in the hospital. Continues to have significant pain and still difficult for patient to take deep breaths.  - trauma surgery consulted, appreciate recs  - NPO at [place]  - scheduled tylenol, prn oxycodone, lidocaine patch, prn cold pack for pain control  - IS, flutter valve  - continuous pulse  "ox, supp O2 as needed  - arm sling  - daily BMP, Mg, Phos  - PT/OT/CM consulted, appreciate recs    Leukocytosis   WBC 18.1 on admission, likely 2/2 to trauma. Initially presented mildly hypothermic and hypertensive but has since normalized. No infectious symptoms or signs at this time.   - daily CBC       Diet: Regular Diet Adult  NPO per Anesthesia Guidelines for Procedure/Surgery Except for: Meds    DVT Prophylaxis: Pneumatic Compression Devices  Matthews Catheter: Not present  Fluids: None  Central Lines: None  Cardiac Monitoring: None  Code Status: Full Code      Clinically Significant Risk Factors Present on Admission                       # Obesity: Estimated body mass index is 34.87 kg/m  as calculated from the following:    Height as of this encounter: 1.803 m (5' 11\").    Weight as of this encounter: 113.4 kg (250 lb).           Disposition Plan      Expected Discharge Date: 12/14/2022                The patient's care was discussed with Dr. Stubbs.     Ashvin Henning MD  Hospitalist Service  [facility]  Securely message with the Vocera Web Console (learn more here)  Text page via LookTracker Paging/Directory       ______________________________________________________________________    Chief Complaint   R shoulder and chest pain and SOB after falling on ice auger.   History is obtained from the patient and ED provider.     History of Present Illness   Patient reports being out ice fishing when he fell on his ice auger, getting wind knocked out of him, experiencing immediate R shoulder and chest pain in addition to SOB. No head trauma, LOC, N/V, HA, blurry vision, or abd pain. Continues to have significant pain, rating 7-8/10, is manageable if he is not moving.     Review of Systems    The 10 point Review of Systems is negative other than noted in the HPI or here.    Past Medical History    Methamphetamine use disorder    Past Surgical History   Reviewed and otherwise noncontributory. "     Social History   30 year smoking tobacco history, currently up to 1.5ppd. Has 20 year history of meth use, reports being clean for 6 years. Occasional EtOH use.     Family History   I have reviewed this patient's family history and updated it with pertinent information if needed.  Family History   Problem Relation Age of Onset     Gastrointestinal Disease Father         crohns     Cardiovascular Father         MI       Prior to Admission Medications   None     Allergies   No Known Allergies    Physical Exam   Vital Signs: Temp: 98.7  F (37.1  C) Temp src: Oral BP: 137/81 Pulse: 97   Resp: 16 SpO2: 92 % O2 Device: None (Room air)    Weight: 250 lbs 0 oz    General Appearance: sitting up in chair, pleasant, frequent grimacing, cooperative, NAD.  HEENT: normocephalic, atraumatic. EOMi, PERRL, conjunctiva and sclera normal.   Respiratory: No increased work of breathing. Diminished R basilar breath sounds, otherwise clear.   Cardiovascular: RRR. No murmurs. Strong peripheral pulses, no edema.   GI: Soft, nondistended, nontender. No masses, rebound tenderness or guarding.   Skin: Warm, dry. No rashes, jaundice or active bleeding.   Musculoskeletal: Restricted movement of RUE. No midline or paraspinal tenderness.   Neurologic: No focal deficits noted.   Psychiatric: A&Ox3. Thought process linear and logical. Mood and affect normal.     Data   Data reviewed today: I reviewed all medications, new labs and imaging results over the last 24 hours.     Recent Labs   Lab 12/13/22  1238   WBC 18.1*   HGB 15.5   MCV 92         POTASSIUM 3.8   CHLORIDE 103   CO2 25   BUN 12.8   CR 0.97   ANIONGAP 11   GLORY 9.3   *     Recent Results (from the past 24 hour(s))   XR Shoulder Right 2 Views    Narrative    EXAM: XR SHOULDER RIGHT 2 VIEWS  LOCATION: [facility]  DATE/TIME: 12/13/2022 12:07 PM    INDICATION: Injury, pain  COMPARISON: None.      Impression    IMPRESSION: Acute mildly  comminuted and displaced fracture of the mid to distal clavicular shaft. The main distal fragment is displaced inferiorly by approximately 3 mm. There is a separate displaced cortical bone fragment adjacent and superior to the   fracture. No dislocation. Underlying mild degenerative arthrosis of the AC joint.    Multiple acute displaced right rib fractures.   Ribs XR, unilat 3 views + PA chest, right    Narrative    EXAM: XR RIBS and CHEST RT 3VW  LOCATION: [facility]  DATE/TIME: 12/13/2022 12:08 PM    INDICATION: fall r sided tenderness  COMPARISON: None.      Impression    IMPRESSION: Contiguous mildly displaced fractures of the right third, fourth, fifth, sixth, and seventh ribs posterolaterally, which raises the possibility of flail chest. There are some patchy adjacent airspace opacities and suggestion of right apical   hyperlucency, which could represent hemorrhage and associated pneumothorax. Recommend chest CT to assess for pneumothorax and additional fractures.       CT Chest/Abdomen/Pelvis w Contrast    Narrative    EXAM: CT CHEST/ABDOMEN/PELVIS W CONTRAST  LOCATION: [facility]  DATE/TIME: 12/13/2022 2:59 PM    INDICATION: trauma. known right sided rib fractures. evaluation of pneumothorax, pulmonary contusion, other internal injuries.  COMPARISON: None.  TECHNIQUE: CT scan of the chest, abdomen, and pelvis was performed following injection of IV contrast. Multiplanar reformats were obtained. Dose reduction techniques were used.   CONTRAST: isovue 370 100ml    FINDINGS:     LUNGS AND PLEURA: Small right hemopneumothorax with trace amount of pleural fluid layering dependently and pneumothorax in the anterior basal right chest. Patchy groundglass and consolidative opacities are present in the posterior right lower lobe   consistent with a combination of atelectasis and probable lung contusion laterally. There is limited subpleural opacity in the  posterolateral right upper lobe consistent with subtle lung contusion. The left lung remains well-expanded without focal   opacity. Airways are normal.    MEDIASTINUM: Mediastinal structures are aligned in the midline. Cardiac chambers are normal in size. Normal caliber thoracic aorta with 2 vessel arch anatomy. No mediastinal hematoma. No lymphadenopathy. Esophagus is decompressed. Imaged thyroid gland is   normal.    CORONARY ARTERY CALCIFICATION: None.    HEPATOBILIARY: Normal.    PANCREAS: Normal.    SPLEEN: Normal.    ADRENAL GLANDS: Normal.    KIDNEYS/BLADDER: Normal.    BOWEL: Normal.    LYMPH NODES: Normal.    VASCULATURE: Unremarkable.    PELVIC ORGANS: Normal.    MUSCULOSKELETAL: There is a comminuted fracture of the central and lateral thirds of the right clavicle with surrounding soft tissue edema. Right acromioclavicular joint has normal alignment. Minimally displaced fractures of the right lateral fifth,   sixth, and seventh ribs are present with edema and small foci of emphysema in the intercostal spaces..      Impression    IMPRESSION:    1.  Small right hemopneumothorax.  2.  Most of the right lower lobe opacities likely reflect incomplete inflation/atelectasis. Areas of opacity in the lateral right lower lobe and in the limited subpleural distribution posterolateral right upper lobe likely relate to lung contusion.  3.  Acute comminuted fracture of the middle and lateral thirds of the right clavicle and minimally displaced fractures of the right lateral fifth through seventh ribs.

## 2022-12-14 NOTE — PLAN OF CARE
"  Problem: Plan of Care - These are the overarching goals to be used throughout the patient stay.    Goal: Plan of Care Review  Description: The Plan of Care Review/Shift note should be completed every shift.  The Outcome Evaluation is a brief statement about your assessment that the patient is improving, declining, or no change.  This information will be displayed automatically on your shift note.  Outcome: Progressing  Goal: Patient-Specific Goal (Individualized)  Description: You can add care plan individualizations to a care plan. Examples of Individualization might be:  \"Parent requests to be called daily at 9am for status\", \"I have a hard time hearing out of my right ear\", or \"Do not touch me to wake me up as it startles me\".  Outcome: Progressing  Goal: Absence of Hospital-Acquired Illness or Injury  Outcome: Progressing  Intervention: Identify and Manage Fall Risk  Recent Flowsheet Documentation  Taken 12/14/2022 0846 by Mayte Singleton RN  Safety Promotion/Fall Prevention:   activity supervised   assistive device/personal items within reach   nonskid shoes/slippers when out of bed   patient and family education   room organization consistent  Goal: Optimal Comfort and Wellbeing  Outcome: Progressing  Intervention: Monitor Pain and Promote Comfort  Recent Flowsheet Documentation  Taken 12/14/2022 1234 by Mayte Singleton RN  Pain Management Interventions: medication (see MAR)  Taken 12/14/2022 0825 by Mayte Singleton RN  Pain Management Interventions: medication (see MAR)  Goal: Readiness for Transition of Care  Outcome: Progressing     Problem: Pain Acute  Goal: Optimal Pain Control and Function  Outcome: Progressing  Intervention: Develop Pain Management Plan  Recent Flowsheet Documentation  Taken 12/14/2022 1234 by Mayte Singleton RN  Pain Management Interventions: medication (see MAR)  Taken 12/14/2022 0825 by Mayte Singleton RN  Pain Management Interventions: medication (see MAR)   "   Problem: Gas Exchange Impaired  Goal: Optimal Gas Exchange  Outcome: Progressing     Problem: Respiratory Compromise (Pneumothorax)  Goal: Optimal Oxygenation and Ventilation  Outcome: Progressing  Intervention: Manage Pneumothorax Effects  Recent Flowsheet Documentation  Taken 12/14/2022 1340 by Mayte Singleton, RN  Level Incentive Spirometer (mL): 1250  Taken 12/14/2022 0846 by Mayte Singleton, RN  Administration (IS): self-administered  Level Incentive Spirometer (mL): 1000  Incentive Spirometer Predicted Level (mL): 750  Number of Repetitions (IS): 3  Patient Tolerance (IS): fair   Goal Outcome Evaluation:  Pt weaned to RA sating 92%. Performing IS to 1250. Coughing up yellow phlegm. Adequate pain control with oxycodone q 4hrs prn, scheduled tylenol. Up in chair with shift, tolerated.

## 2022-12-14 NOTE — PLAN OF CARE
"  Problem: Plan of Care - These are the overarching goals to be used throughout the patient stay.    Goal: Plan of Care Review  Description: The Plan of Care Review/Shift note should be completed every shift.  The Outcome Evaluation is a brief statement about your assessment that the patient is improving, declining, or no change.  This information will be displayed automatically on your shift note.  Outcome: Progressing  Goal: Patient-Specific Goal (Individualized)  Description: You can add care plan individualizations to a care plan. Examples of Individualization might be:  \"Parent requests to be called daily at 9am for status\", \"I have a hard time hearing out of my right ear\", or \"Do not touch me to wake me up as it startles me\".  Outcome: Progressing  Goal: Absence of Hospital-Acquired Illness or Injury  Outcome: Progressing  Intervention: Identify and Manage Fall Risk  Recent Flowsheet Documentation  Taken 12/14/2022 0417 by Awa Lassiter, RN  Safety Promotion/Fall Prevention:   activity supervised   lighting adjusted   nonskid shoes/slippers when out of bed   room near nurse's station  Taken 12/14/2022 0019 by Awa Lassiter, RN  Safety Promotion/Fall Prevention:   activity supervised   lighting adjusted   nonskid shoes/slippers when out of bed   room near nurse's station  Goal: Optimal Comfort and Wellbeing  Outcome: Progressing  Intervention: Monitor Pain and Promote Comfort  Recent Flowsheet Documentation  Taken 12/14/2022 0417 by Awa Lassiter, RN  Pain Management Interventions: medication (see MAR)  Taken 12/14/2022 0019 by Awa Lassiter, RN  Pain Management Interventions: medication (see MAR)  Goal: Readiness for Transition of Care  Outcome: Progressing     Problem: Pain Acute  Goal: Optimal Pain Control and Function  Outcome: Progressing  Intervention: Develop Pain Management Plan  Recent Flowsheet Documentation  Taken 12/14/2022 0417 by Awa Lassiter, RN  Pain Management " Interventions: medication (see MAR)  Taken 12/14/2022 0019 by Awa Lassiter, RN  Pain Management Interventions: medication (see MAR)     Problem: Gas Exchange Impaired  Goal: Optimal Gas Exchange  Outcome: Progressing  Intervention: Optimize Oxygenation and Ventilation  Recent Flowsheet Documentation  Taken 12/14/2022 0417 by Awa Lassiter, RN  Head of Bed (HOB) Positioning: HOB at 20-30 degrees  Taken 12/14/2022 0019 by Awa Lassiter RN  Head of Bed (HOB) Positioning: HOB at 20-30 degrees     Problem: Respiratory Compromise (Pneumothorax)  Goal: Optimal Oxygenation and Ventilation  Outcome: Progressing  Intervention: Manage Pneumothorax Effects  Recent Flowsheet Documentation  Taken 12/14/2022 0019 by Awa Lassiter RN  Administration (IS): refused   Goal Outcome Evaluation:       Pt a/o with c/o pain 5-7 at rest but jumps to 10 with movement. Prn oxycodone given x2. Pt NPO since midnight and voiding in urinal. Pt o2 sats dropped to 89% on room air, put on 2L nc. Continuous pulse ox in place. Will monitor.

## 2022-12-14 NOTE — PLAN OF CARE
Problem: Respiratory Compromise (Pneumothorax)  Goal: Optimal Oxygenation and Ventilation  Intervention: Manage Pneumothorax Effects  Recent Flowsheet Documentation  Taken 12/13/2022 1806 by Yeni Martínez RN  Administration (IS): refused     Problem: Pain Acute  Goal: Optimal Pain Control and Function  Intervention: Develop Pain Management Plan  Recent Flowsheet Documentation  Taken 12/13/2022 1806 by Yeni Martínez RN  Pain Management Interventions: MD notified (comment)     Goal Outcome Evaluation:       Pt presented to hospital today after falling on the ice and landing on his ice auger. X-rays positive for right 3rd through 7th rib fractures and fx of the right clavicle. CT shows small hemopneumothorax. Pt is maintaining O2 sats >92% on RA. On continuous pulse ox. Breathing is shallow. Denies shortness of breath. Pain with deep breathing. Introduced incentive spirometer however, pt refuses use at this time 2/2 pain. He is independent in his room with ambulation. Surgery consult pending. Pt will be NPO after midnight. Received scheduled Tylenol, Lidocaine patch to right chest and prn Oxycodone 10 mg at 2015 for pain management, which has been effective. Pt is currently sleeping comfortably in bed. VS stable. He is tolerating a regular diet. Appetite is adequate. A&O x4. Continent of bowel and bladder.

## 2022-12-14 NOTE — PROGRESS NOTES
RCAT Treatment Plan    Patient Score: 7  Patient Acuity: 4    Clinical Indication for Therapy: unable to deep breath    Therapy Ordered: Flutter valve Q2 W/A, NIF/FVC TID    Assessment Summary: PT has -60 cmH20 on NIF and 1.3 L VC. PT also able to get 1250 ml on IS and demonstrates good technique with flutter valve independently. discharge NIF/VC, change Flutter Valve to PRN.    Sheldon Tucker, RT  12/14/2022

## 2022-12-14 NOTE — CONSULTS
Tobacco Treatment Consult  12/14/2022, 12:40 PM    Patient Admitted for: Clavicle fracture [S42.009A]  Pulmonary contusion [S27.329A]  Hemopneumothorax on right [J94.2]  Multiple rib fractures [S22.49XA]  Fall, initial encounter [W19.XXXA] on 12/13/2022    History of Tobacco Use:   Tobacco Product(s):cigarette  Amount used: 1.5/day  Number of quit attempts in past: 2  Longest period of without use: 9 months  Pharmacotherapy tried in the past: patch and gum. He did not find them particularly helpful  Fagerstrom Score: 5 Level of dependence 4-7 moderate  Medical co-morbidities impacting tobacco use:history of substance abuse    Assessment:   Importance of quitting to patient: 10  Current confidence in ability to quit: 10  Readiness to quit/planning to quit: ready  Reasons for wanting to quit: health, money  Emotional Triggers:anger, boredom and nervous or stressed  Physical and behavioral triggers: drinking alcohol, driving, drinking coffee and eating a meal  Nicotine withdrawal symptoms experiencing as an inpatient: nervousness and irritability  Current major life stressors: experiencing a major health problem, financial/job stress  Barriers to quitting: lives with Mom who still smokes  -Christopher is very motivated to quit and feels that this is an opportune time to do so. He does not want any NRT at this time and is very confident that he can quit cold turkey.    Education done during visit:  -Discussed triggers and response to them  -Discussed benefits to quitting tobacco use  -Educated on physical benefits to health of tobacco cessation including improved healing  -Education on use of pharmacotherapy products and discussed options to determine what may work best for patient.  -Discussed barriers to quitting and how patient feels they may overcome them  -Educated on benefits of having a support system and remembering their reasons for quitting  -Issued tobacco cessation materials and resource  information.    Recommendations:  Smoking Cessation  -In the hospital recommend the patient have the following pharmacotherapy: patient is declining NRT at this time  -Upon discharge recommend the patient have the following pharmacotherapy: Patient is declining at this time and wants to quit cold turkey. Should he change his mind, I would recommend: Patch 21 mg and Gum 2 mg every 1-2 hours   -Taper NRT recommended after 4-6 weeks of successful cessation for patch.  Patch down to 14 mg for 4 weeks then 7 mg for 4 weeks.-Continued cessation therapy by this service via phone  -Continued encouragement from health care providers to quit and stay tobacco free.      2 will participate in follow-up calls post-discharge. Will continue to be available to patient throughout hospital stay.    Total 25 minutes spent in smoking cessation, and 35 minutes spent in chart review,care coordination, and documentation.    Saul Hamilton RT, Chronic Pulmonary Disease Specialist, Certified Tobacco Treatment Specialist  Phone [phone]

## 2022-12-15 ENCOUNTER — APPOINTMENT (OUTPATIENT)
Dept: PHYSICAL THERAPY | Facility: HOSPITAL | Age: 42
DRG: 199 | End: 2022-12-15

## 2022-12-15 LAB
ANION GAP SERPL CALCULATED.3IONS-SCNC: 9 MMOL/L (ref 7–15)
BUN SERPL-MCNC: 12 MG/DL (ref 6–20)
CALCIUM SERPL-MCNC: 8.6 MG/DL (ref 8.6–10)
CHLORIDE SERPL-SCNC: 95 MMOL/L (ref 98–107)
CREAT SERPL-MCNC: 0.85 MG/DL (ref 0.67–1.17)
DEPRECATED HCO3 PLAS-SCNC: 25 MMOL/L (ref 22–29)
ERYTHROCYTE [DISTWIDTH] IN BLOOD BY AUTOMATED COUNT: 12.4 % (ref 10–15)
GFR SERPL CREATININE-BSD FRML MDRD: >90 ML/MIN/1.73M2
GLUCOSE SERPL-MCNC: 105 MG/DL (ref 70–99)
HCT VFR BLD AUTO: 41.3 % (ref 40–53)
HGB BLD-MCNC: 13.9 G/DL (ref 13.3–17.7)
MAGNESIUM SERPL-MCNC: 1.8 MG/DL (ref 1.7–2.3)
MCH RBC QN AUTO: 30.9 PG (ref 26.5–33)
MCHC RBC AUTO-ENTMCNC: 33.7 G/DL (ref 31.5–36.5)
MCV RBC AUTO: 92 FL (ref 78–100)
PHOSPHATE SERPL-MCNC: 3.1 MG/DL (ref 2.5–4.5)
PLATELET # BLD AUTO: 266 10E3/UL (ref 150–450)
POTASSIUM SERPL-SCNC: 3.8 MMOL/L (ref 3.4–5.3)
RBC # BLD AUTO: 4.5 10E6/UL (ref 4.4–5.9)
SODIUM SERPL-SCNC: 129 MMOL/L (ref 136–145)
WBC # BLD AUTO: 11.4 10E3/UL (ref 4–11)

## 2022-12-15 PROCEDURE — 250N000013 HC RX MED GY IP 250 OP 250 PS 637

## 2022-12-15 PROCEDURE — 258N000003 HC RX IP 258 OP 636

## 2022-12-15 PROCEDURE — 97530 THERAPEUTIC ACTIVITIES: CPT | Mod: GP

## 2022-12-15 PROCEDURE — 82310 ASSAY OF CALCIUM: CPT

## 2022-12-15 PROCEDURE — 250N000011 HC RX IP 250 OP 636

## 2022-12-15 PROCEDURE — 97116 GAIT TRAINING THERAPY: CPT | Mod: GP

## 2022-12-15 PROCEDURE — 83735 ASSAY OF MAGNESIUM: CPT

## 2022-12-15 PROCEDURE — 99233 SBSQ HOSP IP/OBS HIGH 50: CPT | Mod: GC

## 2022-12-15 PROCEDURE — 84100 ASSAY OF PHOSPHORUS: CPT

## 2022-12-15 PROCEDURE — 85027 COMPLETE CBC AUTOMATED: CPT

## 2022-12-15 PROCEDURE — 36415 COLL VENOUS BLD VENIPUNCTURE: CPT

## 2022-12-15 PROCEDURE — 120N000001 HC R&B MED SURG/OB

## 2022-12-15 RX ORDER — HYDROMORPHONE HYDROCHLORIDE 1 MG/ML
0.5 INJECTION, SOLUTION INTRAMUSCULAR; INTRAVENOUS; SUBCUTANEOUS EVERY 4 HOURS PRN
Status: DISCONTINUED | OUTPATIENT
Start: 2022-12-15 | End: 2022-12-16 | Stop reason: HOSPADM

## 2022-12-15 RX ORDER — POLYETHYLENE GLYCOL 3350 17 G/17G
17 POWDER, FOR SOLUTION ORAL DAILY
Status: DISCONTINUED | OUTPATIENT
Start: 2022-12-15 | End: 2022-12-16 | Stop reason: HOSPADM

## 2022-12-15 RX ADMIN — OXYCODONE HYDROCHLORIDE 10 MG: 5 TABLET ORAL at 03:57

## 2022-12-15 RX ADMIN — HYDROMORPHONE HYDROCHLORIDE 0.5 MG: 1 INJECTION, SOLUTION INTRAMUSCULAR; INTRAVENOUS; SUBCUTANEOUS at 10:36

## 2022-12-15 RX ADMIN — LIDOCAINE PATCH 4% 1 PATCH: 40 PATCH TOPICAL at 20:02

## 2022-12-15 RX ADMIN — OXYCODONE HYDROCHLORIDE 10 MG: 5 TABLET ORAL at 13:20

## 2022-12-15 RX ADMIN — SENNOSIDES AND DOCUSATE SODIUM 1 TABLET: 8.6; 5 TABLET ORAL at 08:06

## 2022-12-15 RX ADMIN — OXYCODONE HYDROCHLORIDE 10 MG: 5 TABLET ORAL at 23:59

## 2022-12-15 RX ADMIN — POLYETHYLENE GLYCOL 3350 17 G: 17 POWDER, FOR SOLUTION ORAL at 10:36

## 2022-12-15 RX ADMIN — ACETAMINOPHEN 975 MG: 325 TABLET ORAL at 03:57

## 2022-12-15 RX ADMIN — OXYCODONE HYDROCHLORIDE 10 MG: 5 TABLET ORAL at 08:06

## 2022-12-15 RX ADMIN — SODIUM CHLORIDE 1000 ML: 9 INJECTION, SOLUTION INTRAVENOUS at 12:12

## 2022-12-15 RX ADMIN — HYDROMORPHONE HYDROCHLORIDE 0.5 MG: 1 INJECTION, SOLUTION INTRAMUSCULAR; INTRAVENOUS; SUBCUTANEOUS at 15:54

## 2022-12-15 RX ADMIN — SENNOSIDES AND DOCUSATE SODIUM 1 TABLET: 8.6; 5 TABLET ORAL at 20:02

## 2022-12-15 RX ADMIN — HYDROMORPHONE HYDROCHLORIDE 0.5 MG: 1 INJECTION, SOLUTION INTRAMUSCULAR; INTRAVENOUS; SUBCUTANEOUS at 22:28

## 2022-12-15 RX ADMIN — ACETAMINOPHEN 975 MG: 325 TABLET ORAL at 12:05

## 2022-12-15 RX ADMIN — ACETAMINOPHEN 975 MG: 325 TABLET ORAL at 20:02

## 2022-12-15 RX ADMIN — OXYCODONE HYDROCHLORIDE 10 MG: 5 TABLET ORAL at 18:58

## 2022-12-15 ASSESSMENT — ACTIVITIES OF DAILY LIVING (ADL)
ADLS_ACUITY_SCORE: 19
ADLS_ACUITY_SCORE: 20
ADLS_ACUITY_SCORE: 19
ADLS_ACUITY_SCORE: 20
ADLS_ACUITY_SCORE: 19
ADLS_ACUITY_SCORE: 20
ADLS_ACUITY_SCORE: 20

## 2022-12-15 NOTE — PROGRESS NOTES
"Patient is alert and oriented. At baseline he is independent with ADLs. He does seasonal work as a croft. 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 He has 8 children . He lives with his mother in [place]. Referral made to financial SW for \"no insurance\". Patient plans on discharging to home with friend to transport. No needs anticipated.  "

## 2022-12-15 NOTE — PLAN OF CARE
"  Problem: Plan of Care - These are the overarching goals to be used throughout the patient stay.    Goal: Plan of Care Review  Description: The Plan of Care Review/Shift note should be completed every shift.  The Outcome Evaluation is a brief statement about your assessment that the patient is improving, declining, or no change.  This information will be displayed automatically on your shift note.  Outcome: Progressing  Goal: Patient-Specific Goal (Individualized)  Description: You can add care plan individualizations to a care plan. Examples of Individualization might be:  \"Parent requests to be called daily at 9am for status\", \"I have a hard time hearing out of my right ear\", or \"Do not touch me to wake me up as it startles me\".  Outcome: Progressing  Goal: Absence of Hospital-Acquired Illness or Injury  Outcome: Progressing  Intervention: Prevent Skin Injury  Recent Flowsheet Documentation  Taken 12/14/2022 2340 by Awa Lassiter, RN  Body Position: position changed independently  Goal: Optimal Comfort and Wellbeing  Outcome: Progressing  Goal: Readiness for Transition of Care  Outcome: Progressing     Problem: Pain Acute  Goal: Optimal Pain Control and Function  Outcome: Progressing     Problem: Gas Exchange Impaired  Goal: Optimal Gas Exchange  Outcome: Progressing  Intervention: Optimize Oxygenation and Ventilation  Recent Flowsheet Documentation  Taken 12/14/2022 2340 by Awa Lassiter, RN  Head of Bed (HOB) Positioning: HOB at 20 degrees     Problem: Respiratory Compromise (Pneumothorax)  Goal: Optimal Oxygenation and Ventilation  Outcome: Progressing  Intervention: Manage Pneumothorax Effects  Recent Flowsheet Documentation  Taken 12/14/2022 2340 by Awa Lassiter, RN  Administration (IS): instruction provided, follow-up  Level Incentive Spirometer (mL): 1250  Patient Tolerance (IS): fair   Goal Outcome Evaluation:         Pt a/o with c/o pain up to 10, prn oxycodone given. Pt reports that he is " unable to get up out of bed, too painful. Assisted pt with activity in bed prn. Will monitor.

## 2022-12-15 NOTE — PLAN OF CARE
"  Problem: Plan of Care - These are the overarching goals to be used throughout the patient stay.    Goal: Plan of Care Review  Description: The Plan of Care Review/Shift note should be completed every shift.  The Outcome Evaluation is a brief statement about your assessment that the patient is improving, declining, or no change.  This information will be displayed automatically on your shift note.  Outcome: Progressing  Goal: Patient-Specific Goal (Individualized)  Description: You can add care plan individualizations to a care plan. Examples of Individualization might be:  \"Parent requests to be called daily at 9am for status\", \"I have a hard time hearing out of my right ear\", or \"Do not touch me to wake me up as it startles me\".  Outcome: Progressing  Goal: Absence of Hospital-Acquired Illness or Injury  Outcome: Progressing  Intervention: Identify and Manage Fall Risk  Recent Flowsheet Documentation  Taken 12/15/2022 0810 by Mayte Singleton RN  Safety Promotion/Fall Prevention:   activity supervised   assistive device/personal items within reach   nonskid shoes/slippers when out of bed   patient and family education   room organization consistent  Intervention: Prevent Skin Injury  Recent Flowsheet Documentation  Taken 12/15/2022 0810 by Mayte Singleton RN  Body Position: position changed independently  Goal: Optimal Comfort and Wellbeing  Outcome: Progressing  Intervention: Monitor Pain and Promote Comfort  Recent Flowsheet Documentation  Taken 12/15/2022 1320 by Mayte Singleton RN  Pain Management Interventions:   medication (see MAR)   breathing exercises  Taken 12/15/2022 0806 by Mayte Singleton RN  Pain Management Interventions:   medication (see MAR)   breathing exercises  Goal: Readiness for Transition of Care  Outcome: Progressing     Problem: Pain Acute  Goal: Optimal Pain Control and Function  Outcome: Not Progressing  Intervention: Develop Pain Management Plan  Recent Flowsheet " Documentation  Taken 12/15/2022 1320 by Mayte Singleton RN  Pain Management Interventions:   medication (see MAR)   breathing exercises  Taken 12/15/2022 0806 by Mayte Singleton RN  Pain Management Interventions:   medication (see MAR)   breathing exercises  Intervention: Prevent or Manage Pain  Recent Flowsheet Documentation  Taken 12/15/2022 0810 by Mayte Singleton RN  Medication Review/Management: medications reviewed     Problem: Gas Exchange Impaired  Goal: Optimal Gas Exchange  Outcome: Not Progressing  Intervention: Optimize Oxygenation and Ventilation  Recent Flowsheet Documentation  Taken 12/15/2022 0810 by Mayte Singleton RN  Head of Bed (HOB) Positioning: HOB at 20-30 degrees     Problem: Respiratory Compromise (Pneumothorax)  Goal: Optimal Oxygenation and Ventilation  Outcome: Not Progressing  Intervention: Manage Pneumothorax Effects  Recent Flowsheet Documentation  Taken 12/15/2022 0810 by Mayte Singleton RN  Administration (IS): self-administered  Level Incentive Spirometer (mL): 1300  Number of Repetitions (IS): 4  Patient Tolerance (IS): fair   Goal Outcome Evaluation:  Pt remains on 2L NC, sob with any exertion. Did ambulate in perez short distance with PT. Pt very painful to move at all. IV dilaudid admin'd with mild relief. Continues with oxycodone Q4hrs prn. Miralax given as no bm since preadmission.

## 2022-12-15 NOTE — PLAN OF CARE
Problem: Pain Acute  Goal: Optimal Pain Control and Function  Intervention: Develop Pain Management Plan  Recent Flowsheet Documentation  Taken 12/14/2022 2050 by Yeni Martínez RN  Pain Management Interventions: medication (see MAR)     Problem: Gas Exchange Impaired  Goal: Optimal Gas Exchange  Intervention: Optimize Oxygenation and Ventilation  Recent Flowsheet Documentation  Taken 12/14/2022 1614 by Yeni Martínez RN  Head of Bed (HOB) Positioning: HOB at 15 degrees     Problem: Respiratory Compromise (Pneumothorax)  Goal: Optimal Oxygenation and Ventilation  Outcome: Progressing     Goal Outcome Evaluation:       Pt presented to hospital yesterday after experiencing a fall on the ice with multiple right rib fractures, a right clavicle fracture and small hemopneumothorax. Repeat x-ray this AM shows no pneumothorax and no significant hemothorax. Ortho and surgery consulting. No surgical indications at this time however, pt will be following up with ortho as an out patient for further imaging. Splint in place to RUE at all times for support. CMS intact. He is independent in his room but has had minimal mobility today 2/2 pain. Received scheduled Tylenol, Lidocaine patch to right clavicle and prn Oxycodone 10 mg at 1630 for pain rated 5 out of 10 at rest. Declines ice pack. WBC is trending down. Lung sounds are diminished. Continues to experience pain with deep breathing. Breath sounds are shallow. Encouraged incentive spirometry use at bedside. He does have a productive cough. O2 sats dipping into the upper 80's on RA. Oxygen applied at 2 liters via NC to maintain O2 sats >92%. Pt is a smoker. He is tolerating a regular diet. Appetite is adequate.

## 2022-12-15 NOTE — PROGRESS NOTES
[facility]    Progress Note - [facility]      Date of Admission:  12/13/2022    Assessment & Plan   Christopher Euceda Jr. is a 41 year old male admitted on 12/13/2022.  He has a past medical history significant for methamphetamine abuse, now sober.  He presented with R shoulder, chest pain and SOB 2/2 to falling on ice auger while ice fishing. Admitted for acute R 3rd-7th rib fractures, clavicular fracture, small R hemopneumothorax, and pulmonary contusion.     Right acute 3rd-7th rib fractures  Following chest wall trauma.   XR chest significant for contiguous mildly displaced fractures of R 3rd through 7th ribs posterolaterally. Associated hemopneumothorax as below.  Trauma surgery evaluated, no need for surgical intervention.  Continued pain, likely leading to hypoxia as below.  We will continue with pain management.  - trauma surgery consulted, signed off  - scheduled tylenol, prn oxycodone, lidocaine patch, prn cold pack for pain control   -IV Dilaudid as needed 12/15   -Consider nerve block with anesthesia if symptoms worsen  - IS, flutter valve  - continuous pulse ox, supp O2 as needed  - daily BMP  - PT/OT/CM consulted, appreciate recs  - Bowel regimen        Right small hemopneumothorax  Acute hypoxic respiratory failure.   Pulmonary contusion  CT chest/abdomen significant for small R hemopneumothorax and opacities consistent with incomplete inflation/atelectasis and contusion.   Repeat chest x-ray without evidence of pneumothorax or hemothorax.  Continued hypoxia overnight, continues to require supplemental oxygen.  Likely in setting of pulmonary contusion/atelectasis in the setting of pain.  With continued hypoxia, unable to discharge to home today.  We will continue encourage mobility, incentive spirometry.  Repeat chest x-ray if respiratory status worsens.  -Pain management as above  - Incentive spirometry   -Supplemental oxygen        Clavicle fracture  XR  "significant for acute mildly comminuted and displaced fracture of vtr-yx-aknden clavicular shaft, with main distal fragment inferior by ~3mm, and separate displaced cortical bone fragment superiorly adjacent. No neurovascular compromise.  Evaluate orthopedic surgery, no urgent intervention needed.  Placed in a sling.  - Orthopedic surgery consult, signed off   -Follow-up as an outpatient on 12/16  - Pain management as above  - Arm sling  in place     Leukocytosis   WBC 18.1 on admission, suspect reactive.  Improved to 11.4.  Otherwise hemodynamically stable.      Hyponatremia   Sodium of 129, down from 135 yesterday.  Suspect hypovolemic hyponatremia   -Encouraged oral intake  - Fluid bolus 1L ns  - BMP in the AM        Tobacco use   Tobacco treatment referral placed. Would like to quit cold turkey. Will continue to offer nicotine replacement therapy.       Diet: Regular Diet Adult    DVT Prophylaxis: Pneumatic Compression Devices  Matthews Catheter: Not present  Fluids: None   Central Lines: None  Cardiac Monitoring: None  Code Status: Full Code      Disposition Plan      Expected Discharge Date: 12/15/2022    Discharge Delays: Specialist Consult (enter specialist & decision needed in comments)  Oxygen Needs - Arrange Home O2            The patient's care was discussed with Dr Evelyne Pringle MD  Hospitalist Service  [facility]  Securely message with the Vocera Web Console (learn more here)  Text page via [employer] Paging/Directory         Clinically Significant Risk Factors         # Hyponatremia: Lowest Na = 129 mmol/L in last 2 days, will monitor as appropriate                # Obesity: Estimated body mass index is 34.87 kg/m  as calculated from the following:    Height as of this encounter: 1.803 m (5' 11\").    Weight as of this encounter: 113.4 kg (250 lb)., PRESENT ON ADMISSION         ______________________________________________________________________    Interval History "   Became hypoxic into the 80s overnight.  Still continues to have significant chest wall pain.  Has not been controlled with oxycodone.  Has pain with getting up and moving around.  Did work with PT this morning, was significantly painful.  Continues to use incentive spirometer.     Did cough up little blood tinged mucus this morning.  Denies any shortness of breath.  No headaches, vision changes.     Appetite's been poor.  He is able to drink water, not eating a lot of food.    Data reviewed today: I reviewed all medications, new labs and imaging results over the last 24 hours.     Physical Exam   Vital Signs: Temp: 97.5  F (36.4  C) Temp src: Oral BP: 122/64 Pulse: 76   Resp: 20 SpO2: 92 % O2 Device: Nasal cannula Oxygen Delivery: 2 LPM  Weight: 250 lbs 0 oz  General Appearance: Appears uncomfortable.  Not acutely ill.    Respiratory: Comfortable respiratory effort.  Shallow breaths.  Diminished on the right side.  No crackles.  Cardiovascular: Regular rate rhythm.  No murmurs rubs or gallops.  GI: Nondistended.  Soft nontender.  Bowel sounds present.  Skin: Warm well perfused.  MSK: Right upper extremity with sensation intact.  Radial pulses 2+.  Tenderness to the right chest wall.    Data   Recent Labs   Lab 12/15/22  0457 12/14/22  0458 12/13/22  1238   WBC 11.4* 11.7* 18.1*   HGB 13.9 14.1 15.5   MCV 92 92 92    131* 292   * 135* 139   POTASSIUM 3.8 4.1 3.8   CHLORIDE 95* 101 103   CO2 25 24 25   BUN 12.0 11.0 12.8   CR 0.85 0.80 0.97   ANIONGAP 9 10 11   GLORY 8.6 8.9 9.3   * 97 124*

## 2022-12-16 ENCOUNTER — APPOINTMENT (OUTPATIENT)
Dept: PHYSICAL THERAPY | Facility: HOSPITAL | Age: 42
DRG: 199 | End: 2022-12-16

## 2022-12-16 VITALS
OXYGEN SATURATION: 91 % | HEART RATE: 74 BPM | RESPIRATION RATE: 19 BRPM | TEMPERATURE: 98.2 F | SYSTOLIC BLOOD PRESSURE: 125 MMHG | DIASTOLIC BLOOD PRESSURE: 75 MMHG | HEIGHT: 71 IN | WEIGHT: 250 LBS | BODY MASS INDEX: 35 KG/M2

## 2022-12-16 LAB
ANION GAP SERPL CALCULATED.3IONS-SCNC: 9 MMOL/L (ref 7–15)
BUN SERPL-MCNC: 11.2 MG/DL (ref 6–20)
CALCIUM SERPL-MCNC: 9 MG/DL (ref 8.6–10)
CHLORIDE SERPL-SCNC: 100 MMOL/L (ref 98–107)
CREAT SERPL-MCNC: 0.83 MG/DL (ref 0.67–1.17)
DEPRECATED HCO3 PLAS-SCNC: 25 MMOL/L (ref 22–29)
GFR SERPL CREATININE-BSD FRML MDRD: >90 ML/MIN/1.73M2
GLUCOSE SERPL-MCNC: 98 MG/DL (ref 70–99)
HOLD SPECIMEN: NORMAL
HOLD SPECIMEN: NORMAL
POTASSIUM SERPL-SCNC: 4.2 MMOL/L (ref 3.4–5.3)
SODIUM SERPL-SCNC: 134 MMOL/L (ref 136–145)

## 2022-12-16 PROCEDURE — 250N000013 HC RX MED GY IP 250 OP 250 PS 637

## 2022-12-16 PROCEDURE — 250N000011 HC RX IP 250 OP 636

## 2022-12-16 PROCEDURE — 99238 HOSP IP/OBS DSCHRG MGMT 30/<: CPT | Mod: GC

## 2022-12-16 PROCEDURE — 36415 COLL VENOUS BLD VENIPUNCTURE: CPT

## 2022-12-16 PROCEDURE — 97116 GAIT TRAINING THERAPY: CPT | Mod: GP

## 2022-12-16 PROCEDURE — 80048 BASIC METABOLIC PNL TOTAL CA: CPT

## 2022-12-16 RX ORDER — POLYETHYLENE GLYCOL 3350 17 G/17G
17 POWDER, FOR SOLUTION ORAL DAILY PRN
Qty: 510 G | COMMUNITY
Start: 2022-12-16

## 2022-12-16 RX ORDER — OXYCODONE HYDROCHLORIDE 5 MG/1
5 TABLET ORAL EVERY 6 HOURS PRN
Qty: 10 TABLET | Refills: 0 | Status: SHIPPED | OUTPATIENT
Start: 2022-12-16

## 2022-12-16 RX ORDER — ACETAMINOPHEN 325 MG/1
325-650 TABLET ORAL EVERY 6 HOURS PRN
COMMUNITY
Start: 2022-12-16

## 2022-12-16 RX ADMIN — HYDROMORPHONE HYDROCHLORIDE 0.5 MG: 1 INJECTION, SOLUTION INTRAMUSCULAR; INTRAVENOUS; SUBCUTANEOUS at 10:48

## 2022-12-16 RX ADMIN — ACETAMINOPHEN 975 MG: 325 TABLET ORAL at 13:00

## 2022-12-16 RX ADMIN — HYDROMORPHONE HYDROCHLORIDE 0.5 MG: 1 INJECTION, SOLUTION INTRAMUSCULAR; INTRAVENOUS; SUBCUTANEOUS at 02:30

## 2022-12-16 RX ADMIN — POLYETHYLENE GLYCOL 3350 17 G: 17 POWDER, FOR SOLUTION ORAL at 08:11

## 2022-12-16 RX ADMIN — ACETAMINOPHEN 975 MG: 325 TABLET ORAL at 04:02

## 2022-12-16 RX ADMIN — SENNOSIDES AND DOCUSATE SODIUM 1 TABLET: 8.6; 5 TABLET ORAL at 08:11

## 2022-12-16 RX ADMIN — OXYCODONE HYDROCHLORIDE 10 MG: 5 TABLET ORAL at 04:02

## 2022-12-16 RX ADMIN — OXYCODONE HYDROCHLORIDE 10 MG: 5 TABLET ORAL at 13:00

## 2022-12-16 RX ADMIN — OXYCODONE HYDROCHLORIDE 10 MG: 5 TABLET ORAL at 08:10

## 2022-12-16 RX ADMIN — HYDROMORPHONE HYDROCHLORIDE 0.5 MG: 1 INJECTION, SOLUTION INTRAMUSCULAR; INTRAVENOUS; SUBCUTANEOUS at 06:32

## 2022-12-16 ASSESSMENT — ACTIVITIES OF DAILY LIVING (ADL)
ADLS_ACUITY_SCORE: 18
ADLS_ACUITY_SCORE: 20
ADLS_ACUITY_SCORE: 18
ADLS_ACUITY_SCORE: 20
ADLS_ACUITY_SCORE: 18

## 2022-12-16 NOTE — DISCHARGE SUMMARY
[facility]  Discharge Summary - Medicine & Pediatrics       Date of Admission:  12/13/2022  Date of Discharge:  12/16/2022  Discharging Provider: Tom Pringle MD  Discharge Service: Hospitalist Service    Discharge Diagnoses   Right acute third-seventh rib fractures  Pulmonary contusion  Right clavicular fracture  Tobacco abuse      Follow-ups Needed After Discharge   Follow-up Appointments     Follow Up Care      Please follow-up with Dr. Cordero/Melissa Lisa PA-C on Friday 12/16/2022   at 9:30AM at [facility] location. Call our scheduling line   at [phone] if you need to reschedule.    [facility]:  [address] [address], [place] [address]         Follow-up and recommended labs and tests       Follow up with primary care provider, Physician No Ref-Primary, within 7   days for hospital follow- up.      Please follow-up with the orthopedic surgery team to have your clavicle   fracture evaluated.   [facility] telephone number [phone].  When you call, tell   them you are supposed to see them on 12/16/2022, however you are still in   the hospital.  They will make sure you have an appointment early next   week.             Unresulted Labs Ordered in the Past 30 Days of this Admission     No orders found from 11/13/2022 to 12/14/2022.          Discharge Disposition   Discharged to home  Condition at discharge: Stable      Hospital Course   Christopher Euceda Jr. was admitted on 12/13/2022 for acute rib fractures on the right, pneumohemothorax, and clavicular fracture following a fall..  The following problems were addressed during his hospitalization:      Patient fell on an ice auger while out fishing.  He presented to the ED.  X-ray finding acute fractures of the right third through seventh ribs.  Also showing right acute mildly comminuted and displaced fracture of the mid to distal clavicular shaft.  He was also found of a small right  hemopneumothorax.  Did require oxygen supplementation.  Repeat chest x-ray the day following without evidence of pneumothorax or hemothorax.  His oxygen saturations were stable on day of discharge.  Was no longer requiring supplemental oxygen.    Was evaluated by trauma surgery, no further intervention required.  Also developed a orthopedic surgery.  They placed him in a sling for his clavicular fracture.  Recommended follow-up on 12/16/2022.  Patient was however in the hospital, so this will have to be rescheduled as outpatient.  Provided patient with contact information.    He was discharged home with a short course of oxycodone for pain control.  It was recommended he follow-up with both orthopedic surgery team as well as establishing with a primary care provider.  Also sent home with a incentive spirometer.  Follow-up precautions were provided.        Consultations This Hospital Stay   PHYSICAL THERAPY ADULT IP CONSULT  TRAUMA SURGERY IP CONSULT  ORTHOPEDIC SURGERY IP CONSULT  SMOKING CESSATION PROGRAM IP CONSULT  OCCUPATIONAL THERAPY ADULT IP CONSULT    Code Status   Full Code       The patient was discussed with Dr. Piter Pringle MD  [facility] [address] [place] [address]  Phone: [phone]  Fax: [phone]  ______________________________________________________________________    Physical Exam   Vital Signs: Temp: 98.2  F (36.8  C) Temp src: Oral BP: 125/75 Pulse: 74   Resp: 19 SpO2: 91 % O2 Device: None (Room air) Oxygen Delivery: 2 LPM  Weight: 250 lbs 0 oz  General Appearance: Comfortable.  No acute distress.  Wearing an arm sling.  Respiratory: Cough respiratory.  Good air movement bilaterally.  No crackles or wheezing.  Slightly diminished at the base.  Cardiovascular: Regular rate rhythm.  No murmurs rubs or gallops.  GI: Nondistended.  Soft nontender.  Skin: Warm well perfused.  Extremities -2+  distal pulses.  Right upper extremity neurovascularly intact.        Primary Care Physician   Physician No Ref-Primary    Discharge Orders      Follow Up Care    Please follow-up with Dr. Cordero/Melissa Lisa PA-C on Friday 12/16/2022 at 9:30AM at [facility] location. Call our scheduling line at [phone] if you need to reschedule.    [facility]:  [address], [place] [address]     Reason for your hospital stay    You admitted to the hospital and found to have fractures of your right ribs (3-7) and right collarbone.  You are also found to have a bruise on your lungs.  You were evaluated by both trauma surgery and orthopedic surgery.  Orthopedic surgery recommended you follow-up as outpatient, the appointment was originally scheduled for today (12/16) but since you are in the hospital, this was canceled.  The number to reschedule for [facility] is ([phone]).    You are being sent home with a short course of pain medication to get you through the weekend.  Please continue to use the incentive spirometer that we provided for you to help keep your lungs open.  Please be evaluated if you develop any worsening shortness of breath, fever, chills.     Follow-up and recommended labs and tests     Follow up with primary care provider, Physician No Ref-Primary, within 7 days for hospital follow- up.      Please follow-up with the orthopedic surgery team to have your clavicle fracture evaluated.   [facility] telephone number [phone].  When you call, tell them you are supposed to see them on 12/16/2022, however you are still in the hospital.  They will make sure you have an appointment early next week.     Activity    Your activity upon discharge: activity as tolerated.  Continue to keep your right arm in the sling until you are evaluated by orthopedic surgery.     Diet    Follow this diet upon discharge: Orders Placed This Encounter      Regular Diet Adult        Significant Results and Procedures   Most Recent 3 CBC's:Recent Labs   Lab Test 12/15/22  0457 12/14/22  0458 12/13/22  1238   WBC 11.4* 11.7* 18.1*   HGB 13.9 14.1 15.5   MCV 92 92 92    131* 292     Most Recent 3 BMP's:Recent Labs   Lab Test 12/16/22  0608 12/15/22  0457 12/14/22  0458   * 129* 135*   POTASSIUM 4.2 3.8 4.1   CHLORIDE 100 95* 101   CO2 25 25 24   BUN 11.2 12.0 11.0   CR 0.83 0.85 0.80   ANIONGAP 9 9 10   GLORY 9.0 8.6 8.9   GLC 98 105* 97   ,   Results for orders placed or performed during the hospital encounter of 12/13/22   Ribs XR, unilat 3 views + PA chest, right    Narrative    EXAM: XR RIBS and CHEST RT 3VW  LOCATION: [facility]  DATE/TIME: 12/13/2022 12:08 PM    INDICATION: fall r sided tenderness  COMPARISON: None.      Impression    IMPRESSION: Contiguous mildly displaced fractures of the right third, fourth, fifth, sixth, and seventh ribs posterolaterally, which raises the possibility of flail chest. There are some patchy adjacent airspace opacities and suggestion of right apical   hyperlucency, which could represent hemorrhage and associated pneumothorax. Recommend chest CT to assess for pneumothorax and additional fractures.       XR Shoulder Right 2 Views    Narrative    EXAM: XR SHOULDER RIGHT 2 VIEWS  LOCATION: [facility]  DATE/TIME: 12/13/2022 12:07 PM    INDICATION: Injury, pain  COMPARISON: None.      Impression    IMPRESSION: Acute mildly comminuted and displaced fracture of the mid to distal clavicular shaft. The main distal fragment is displaced inferiorly by approximately 3 mm. There is a separate displaced cortical bone fragment adjacent and superior to the   fracture. No dislocation. Underlying mild degenerative arthrosis of the AC joint.    Multiple acute displaced right rib fractures.   CT Chest/Abdomen/Pelvis w Contrast    Narrative    EXAM: CT CHEST/ABDOMEN/PELVIS W CONTRAST  LOCATION: [facility]  DATE/TIME: 12/13/2022 2:59 PM    INDICATION: trauma. known right sided rib fractures. evaluation of pneumothorax, pulmonary contusion, other internal injuries.  COMPARISON: None.  TECHNIQUE: CT scan of the chest, abdomen, and pelvis was performed following injection of IV contrast. Multiplanar reformats were obtained. Dose reduction techniques were used.   CONTRAST: isovue 370 100ml    FINDINGS:     LUNGS AND PLEURA: Small right hemopneumothorax with trace amount of pleural fluid layering dependently and pneumothorax in the anterior basal right chest. Patchy groundglass and consolidative opacities are present in the posterior right lower lobe   consistent with a combination of atelectasis and probable lung contusion laterally. There is limited subpleural opacity in the posterolateral right upper lobe consistent with subtle lung contusion. The left lung remains well-expanded without focal   opacity. Airways are normal.    MEDIASTINUM: Mediastinal structures are aligned in the midline. Cardiac chambers are normal in size. Normal caliber thoracic aorta with 2 vessel arch anatomy. No mediastinal hematoma. No lymphadenopathy. Esophagus is decompressed. Imaged thyroid gland is   normal.    CORONARY ARTERY CALCIFICATION: None.    HEPATOBILIARY: Normal.    PANCREAS: Normal.    SPLEEN: Normal.    ADRENAL GLANDS: Normal.    KIDNEYS/BLADDER: Normal.    BOWEL: Normal.    LYMPH NODES: Normal.    VASCULATURE: Unremarkable.    PELVIC ORGANS: Normal.    MUSCULOSKELETAL: There is a comminuted fracture of the central and lateral thirds of the right clavicle with surrounding soft tissue edema. Right acromioclavicular joint has normal alignment. Minimally displaced fractures of the right lateral fifth,   sixth, and seventh ribs are present with edema and small foci of emphysema in the intercostal spaces..      Impression    IMPRESSION:    1.  Small right hemopneumothorax.  2.  Most of the right lower lobe opacities  likely reflect incomplete inflation/atelectasis. Areas of opacity in the lateral right lower lobe and in the limited subpleural distribution posterolateral right upper lobe likely relate to lung contusion.  3.  Acute comminuted fracture of the middle and lateral thirds of the right clavicle and minimally displaced fractures of the right lateral fifth through seventh ribs.   XR Chest Port 1 View    Narrative    EXAM: XR CHEST PORT 1 VIEW  LOCATION: [facility]  DATE/TIME: 12/14/2022 12:37 PM    INDICATION: assess stability of hemopneumothorax  COMPARISON: 12/13/2022      Impression    IMPRESSION: Slightly irregular right lateral ribs corresponding to the known fifth through seventh rib fractures. Limited inspiratory volume. Mild right hemidiaphragm elevation. No pneumothorax seen. No significant hemothorax.    Normal heart size. Mild left cardio phrenic angle opacity could represent contusion or atelectasis.       Discharge Medications   Current Discharge Medication List      START taking these medications    Details   acetaminophen (TYLENOL) 325 MG tablet Take 1-2 tablets (325-650 mg) by mouth every 6 hours as needed for mild pain    Associated Diagnoses: Closed fracture of multiple ribs of right side, initial encounter      oxyCODONE (ROXICODONE) 5 MG tablet Take 1 tablet (5 mg) by mouth every 6 hours as needed for moderate pain (4-6)  Qty: 10 tablet, Refills: 0    Associated Diagnoses: Closed fracture of multiple ribs of right side, initial encounter      polyethylene glycol (MIRALAX) 17 GM/Dose powder Take 17 g by mouth daily as needed for constipation  Qty: 510 g    Associated Diagnoses: Drug-induced constipation           Allergies   No Known Allergies

## 2022-12-16 NOTE — PROGRESS NOTES
[facility]    Progress Note - Hospitalist Service       Date of Admission:  12/13/2022    Assessment & Plan   Christopher Euceda Jr. is a 41 year old male admitted on 12/13/2022.  He has a past medical history significant for methamphetamine abuse, now sober.  He presented with R shoulder, chest pain and SOB 2/2 to falling on ice auger while ice fishing. Admitted for acute R 3rd-7th rib fractures, clavicular fracture, small R hemopneumothorax, and pulmonary contusion.     Right acute 3rd-7th rib fractures  Following chest wall trauma.   XR chest significant for contiguous mildly displaced fractures of R 3rd through 7th ribs posterolaterally. Associated hemopneumothorax as below.  Trauma surgery evaluated, no need for surgical intervention.  Continued pain, improving with pain regimen as below.  - trauma surgery consulted, signed off  - scheduled tylenol, prn oxycodone, lidocaine patch, prn cold pack for pain control              -IV Dilaudid as needed 12/15  - IS, flutter valve  - continuous pulse ox, supp O2 as needed  - PT/OT/CM consulted, appreciate recs  - Bowel regimen  -We will discharge home with short course of oral oxycodone and Tylenol        Right small hemopneumothorax  Acute hypoxic respiratory failure.   Pulmonary contusion  CT chest/abdomen significant for small R hemopneumothorax and opacities consistent with incomplete inflation/atelectasis and contusion.   Repeat chest x-ray without evidence of pneumothorax or hemothorax.   O2 saturations between upper 80s to low 90s on room air..  Likely in setting of pulmonary contusion/atelectasis in the setting of pain.    Likely able to discharged home at any point with close follow-up.  We will recheck later this afternoon.  -Pain management as above  - Incentive spirometry   -Supplemental oxygen        Clavicle fracture  XR significant for acute mildly comminuted and displaced fracture of [id number] clavicular shaft, with main  "distal fragment inferior by ~3mm, and separate displaced cortical bone fragment superiorly adjacent. No neurovascular compromise.  Evaluate orthopedic surgery, no urgent intervention needed.  Placed in a sling.  - Orthopedic surgery consult, signed off              -Follow-up outpatient scheduled for today - will have to reschedule for next week.   - Pain management as above  - Arm sling  in place     Leukocytosis   WBC 18.1 on admission, suspect reactive.  Improved to 11.4.  Otherwise hemodynamically stable.         Hyponatremia   Stable at 134 today. Suspect hypovolemic hyponatremia.   -Encouraged oral intake       Tobacco use   Tobacco treatment referral placed. Would like to quit cold turkey. Will continue to offer nicotine replacement therapy.     Lack of medical insurance   Social work provided resources.          Diet: Regular Diet Adult    DVT Prophylaxis: Pneumatic Compression Devices  Matthews Catheter: Not present  Fluids: Oral   Central Lines: None  Cardiac Monitoring: None  Code Status: Full Code      Disposition Plan      Expected Discharge Date: 12/16/2022    Discharge Delays: Oxygen Needs - Arrange Home O2  IV Medication - consider oral or Home Infusion    Discharge Comments: pain control        The patient's care was discussed with the Dr Piter Pringle MD  Hospitalist Service  [facility]  Securely message with the Vocera Web Console (learn more here)  Text page via [employer] Paging/Directory         Clinically Significant Risk Factors         # Hyponatremia: Lowest Na = 129 mmol/L in last 2 days, will monitor as appropriate                # Obesity: Estimated body mass index is 34.87 kg/m  as calculated from the following:    Height as of this encounter: 1.803 m (5' 11\").    Weight as of this encounter: 113.4 kg (250 lb)., PRESENT ON ADMISSION         ______________________________________________________________________    Interval History   Requiring supplemental " oxygen overnight. Pain is improving with addition of IV dilaudid. Some dyspnea with exertion, but doing well with therapy. Up walking around. Coughing up some mucus. Using incentive spirometer.  No fever or chills.  Does feel ready to go home.       Data reviewed today: I reviewed all medications, new labs and imaging results over the last 24 hours.     Physical Exam   Vital Signs: Temp: 98.2  F (36.8  C) Temp src: Oral BP: 125/75 Pulse: 74   Resp: 19 SpO2: 92 % O2 Device: None (Room air) Oxygen Delivery: 2 LPM  Weight: 250 lbs 0 oz  General Appearance: Comfortable, no acute distress  Respiratory: Comfortable respiratory effort. Moving air bilaterally. Diminished at the bases. No crackles or wheezing  Cardiovascular: Regular rate and rhythm   GI: Non distended. Normal bowel sounds. Non tender  Skin: Warm and well perfused.   MSK: Right upper extremity in a sling Neurovascularly intact    Data   Recent Labs   Lab 12/16/22  0608 12/15/22  0457 12/14/22  0458 12/13/22  1238   WBC  --  11.4* 11.7* 18.1*   HGB  --  13.9 14.1 15.5   MCV  --  92 92 92   PLT  --  266 131* 292   * 129* 135* 139   POTASSIUM 4.2 3.8 4.1 3.8   CHLORIDE 100 95* 101 103   CO2 25 25 24 25   BUN 11.2 12.0 11.0 12.8   CR 0.83 0.85 0.80 0.97   ANIONGAP 9 9 10 11   GLORY 9.0 8.6 8.9 9.3   GLC 98 105* 97 124*

## 2022-12-16 NOTE — PLAN OF CARE
Goal Outcome Evaluation:  Pt maintained RA sats this shift >90% RA. Improved pain control with oxycodone, ambulating in room and up in halls with PT. To discharge at 1500.

## 2022-12-16 NOTE — DISCHARGE INSTRUCTIONS
[facility] follow-up appointment with Dr Martinez  Tuziabeladay 12/20 @ 10:45am check-in 11:00am appointment  [address] [place] [address]  Inside [facility]

## 2022-12-16 NOTE — PROGRESS NOTES
1930: Handoff report received from DOMINIQUE Taylor. Dual skin and safety check completed at bedside. Pt is currently resting in bed at this time. R arm remains in sling. No acute concerns at this time.     No acute changes overnight. Pt VSS on on 2L NC. Pt receiving PRN pain medications for pain control - see flowsheets/eMAR. Pt has progressed to up ad leonard in room with bathroom privileges. Will report to oncoming RN.

## 2022-12-16 NOTE — PLAN OF CARE
Problem: Pain Acute  Goal: Optimal Pain Control and Function  Outcome: Progressing  Intervention: Develop Pain Management Plan  Recent Flowsheet Documentation  Taken 12/16/2022 0402 by Awilda Jean RN  Pain Management Interventions: medication (see MAR)  Taken 12/16/2022 0300 by Awilda Jean RN  Pain Management Interventions: medication (see MAR)  Taken 12/16/2022 0229 by Awilda Jean RN  Pain Management Interventions: medication (see MAR)  Taken 12/15/2022 2359 by Awilda Jean RN  Pain Management Interventions:   rest   care clustered   repositioned  Taken 12/15/2022 2341 by Awilda Jean RN  Pain Management Interventions:   rest   care clustered   repositioned  Taken 12/15/2022 2228 by Awilda Jean RN  Pain Management Interventions: medication (see MAR)  Taken 12/15/2022 1954 by Awilda Jean RN  Pain Management Interventions:   rest   care clustered   repositioned  Intervention: Prevent or Manage Pain  Recent Flowsheet Documentation  Taken 12/15/2022 2359 by Awilda Jean RN  Medication Review/Management: medications reviewed  Taken 12/15/2022 1954 by Awilda Jean RN  Medication Review/Management: medications reviewed     Problem: Gas Exchange Impaired  Goal: Optimal Gas Exchange  Outcome: Progressing  Intervention: Optimize Oxygenation and Ventilation  Recent Flowsheet Documentation  Taken 12/16/2022 0620 by Awilda Jean RN  Head of Bed (HOB) Positioning: HOB at 20-30 degrees  Taken 12/16/2022 0402 by Awilda Jean RN  Head of Bed (HOB) Positioning: HOB at 20-30 degrees  Taken 12/16/2022 0229 by Awilda Jean RN  Head of Bed (HOB) Positioning: HOB at 20-30 degrees  Taken 12/16/2022 0210 by Awilda Jean RN  Head of Bed (HOB) Positioning: HOB at 20-30 degrees  Taken 12/15/2022 2359 by Awilda Jean RN  Head of Bed (HOB) Positioning: HOB at 20-30 degrees  Taken 12/15/2022 2341 by Awilda Jean RN  Head of Bed (HOB) Positioning: HOB at 20-30 degrees  Taken 12/15/2022 2248 by Awilda Jean RN  Head of Bed  (HOB) Positioning: HOB at 20-30 degrees  Taken 12/15/2022 2211 by Awilda Jean RN  Head of Bed (HOB) Positioning: HOB at 20-30 degrees  Taken 12/15/2022 1954 by Awilda Jean RN  Head of Bed (HOB) Positioning: HOB at 20-30 degrees

## 2022-12-16 NOTE — PLAN OF CARE
Physical Therapy Discharge Summary    Reason for therapy discharge:    Discharged to home.    Progress towards therapy goal(s). See goals on Care Plan in [facility] electronic health record for goal details.  Goals partially met.  Barriers to achieving goals:   discharge from facility.    Therapy recommendation(s):    No further therapy is recommended.      Ebonie Villasenor, PT  12/16/2022

## 2022-12-16 NOTE — CONSULTS
Integrative Therapy Consult    Healing PresenceYes  Essential Oils: Inhalation (EO/Topical oil)     Orthopedic Blend - HC       Healing Music:       Breathwork:       Guided Imagery:       Acupressure:       Oshibori:       Energy Therapy:       Healing Touch:       Reiki:       Qi Gong:     Massage:        Targeted Massage:    Sleep Promotion:       Other Therapy:       Intervention Reason:       Pre and Post Session Scores: Patient Desires Treatment: yes                             Delivery:         Referrals:      Alyson Ash

## 2022-12-19 ENCOUNTER — PATIENT OUTREACH (OUTPATIENT)
Dept: CARE COORDINATION | Facility: CLINIC | Age: 42
End: 2022-12-19

## 2022-12-19 NOTE — PROGRESS NOTES
Connected Care Resource Center Contact  [facility]/Voicemail     Clinical Data: Transitional Care Management Outreach     Outreach attempted x 2.  Phone rang for over 1 minute, no answer and no voicemail. Unable to leave a message.      Plan:  [facility] will do no further outreaches at this time.       Rosalie Everett, RN  Connected Care Resource [facility]    *Connected Care Resource Team does NOT follow patient ongoing. Referrals are identified based on internal discharge reports and the outreach is to ensure patient has an understanding of their discharge instructions.

## 2023-01-04 ENCOUNTER — TELEPHONE (OUTPATIENT)
Dept: RESPIRATORY THERAPY | Facility: HOSPITAL | Age: 43
End: 2023-01-04

## 2023-01-04 NOTE — TELEPHONE ENCOUNTER
Smoking Cessation Follow up Phone Call    No Answer    Saul Hamilton, RT, CTTS, Chronic Pulmonary Disease Specialist'